# Patient Record
Sex: MALE | Race: WHITE | NOT HISPANIC OR LATINO | Employment: FULL TIME | URBAN - METROPOLITAN AREA
[De-identification: names, ages, dates, MRNs, and addresses within clinical notes are randomized per-mention and may not be internally consistent; named-entity substitution may affect disease eponyms.]

---

## 2023-06-05 DIAGNOSIS — I10 ESSENTIAL HYPERTENSION WITH GOAL BLOOD PRESSURE LESS THAN 130/85: Primary | ICD-10-CM

## 2023-06-05 RX ORDER — ATENOLOL 25 MG/1
TABLET ORAL
Qty: 30 TABLET | Refills: 0 | Status: SHIPPED | OUTPATIENT
Start: 2023-06-05 | End: 2023-06-15

## 2023-06-14 DIAGNOSIS — I10 ESSENTIAL HYPERTENSION WITH GOAL BLOOD PRESSURE LESS THAN 130/85: ICD-10-CM

## 2023-06-15 ENCOUNTER — TELEPHONE (OUTPATIENT)
Dept: PRIMARY CARE | Facility: CLINIC | Age: 68
End: 2023-06-15
Payer: COMMERCIAL

## 2023-06-15 RX ORDER — ATENOLOL 25 MG/1
TABLET ORAL
Qty: 30 TABLET | Refills: 0 | Status: SHIPPED | OUTPATIENT
Start: 2023-06-15 | End: 2023-06-29

## 2023-06-15 NOTE — TELEPHONE ENCOUNTER
Pharmacy continues to request refills on his blood pressure medicine.  However he has not been in the office in quite a long time.  I sent a 30-day supply.  He needs to get in to the office.  Please call him and set up an appointment to see Dr. Gama within the next 3 weeks.  Thank you!

## 2023-06-27 PROBLEM — L30.9 ECZEMA OF HAND: Status: ACTIVE | Noted: 2023-06-27

## 2023-06-27 PROBLEM — H01.003 BLEPHARITIS, BOTH EYES: Status: ACTIVE | Noted: 2023-06-27

## 2023-06-27 PROBLEM — F41.8 ANXIETY ASSOCIATED WITH DEPRESSION: Status: ACTIVE | Noted: 2023-06-27

## 2023-06-27 PROBLEM — Z91.199 NONCOMPLIANCE WITH THERAPEUTIC PLAN: Status: ACTIVE | Noted: 2023-06-27

## 2023-06-27 PROBLEM — H91.90 HEARING LOSS: Status: ACTIVE | Noted: 2023-06-27

## 2023-06-27 PROBLEM — R73.09 ELEVATED GLUCOSE: Status: ACTIVE | Noted: 2023-06-27

## 2023-06-27 PROBLEM — H52.03 HYPEROPIA OF BOTH EYES: Status: ACTIVE | Noted: 2023-06-27

## 2023-06-27 PROBLEM — M17.0 ARTHRITIS OF BOTH KNEES: Status: ACTIVE | Noted: 2023-06-27

## 2023-06-27 PROBLEM — H52.02 HYPEROPIA WITH PRESBYOPIA OF LEFT EYE: Status: ACTIVE | Noted: 2023-06-27

## 2023-06-27 PROBLEM — E78.5 HYPERLIPIDEMIA: Status: ACTIVE | Noted: 2023-06-27

## 2023-06-27 PROBLEM — E11.36: Status: ACTIVE | Noted: 2023-06-27

## 2023-06-27 PROBLEM — J06.9 UPPER RESPIRATORY INFECTION, ACUTE: Status: ACTIVE | Noted: 2023-06-27

## 2023-06-27 PROBLEM — R19.4 BOWEL HABIT CHANGES: Status: ACTIVE | Noted: 2023-06-27

## 2023-06-27 PROBLEM — N40.0 BPH (BENIGN PROSTATIC HYPERPLASIA): Status: ACTIVE | Noted: 2023-06-27

## 2023-06-27 PROBLEM — M75.111 INCOMPLETE TEAR OF RIGHT ROTATOR CUFF: Status: ACTIVE | Noted: 2023-06-27

## 2023-06-27 PROBLEM — R73.03 PREDIABETES: Status: ACTIVE | Noted: 2023-06-27

## 2023-06-27 PROBLEM — H53.9 VISION CHANGES: Status: ACTIVE | Noted: 2023-06-27

## 2023-06-27 PROBLEM — H61.23 BILATERAL IMPACTED CERUMEN: Status: ACTIVE | Noted: 2023-06-27

## 2023-06-27 PROBLEM — K21.00 REFLUX ESOPHAGITIS: Status: ACTIVE | Noted: 2023-06-27

## 2023-06-27 PROBLEM — I10 ESSENTIAL HYPERTENSION WITH GOAL BLOOD PRESSURE LESS THAN 130/85: Status: ACTIVE | Noted: 2023-06-27

## 2023-06-27 PROBLEM — H01.006 BLEPHARITIS, BOTH EYES: Status: ACTIVE | Noted: 2023-06-27

## 2023-06-27 PROBLEM — H52.4 HYPEROPIA WITH PRESBYOPIA OF LEFT EYE: Status: ACTIVE | Noted: 2023-06-27

## 2023-06-27 PROBLEM — G47.00 INSOMNIA: Status: ACTIVE | Noted: 2023-06-27

## 2023-06-27 PROBLEM — H25.813 COMBINED FORM OF SENILE CATARACT OF BOTH EYES: Status: ACTIVE | Noted: 2023-06-27

## 2023-06-29 DIAGNOSIS — I10 ESSENTIAL HYPERTENSION WITH GOAL BLOOD PRESSURE LESS THAN 130/85: ICD-10-CM

## 2023-06-29 RX ORDER — ATENOLOL 25 MG/1
TABLET ORAL
Qty: 15 TABLET | Refills: 0 | Status: SHIPPED | OUTPATIENT
Start: 2023-06-29 | End: 2023-07-13

## 2023-07-03 ENCOUNTER — LAB (OUTPATIENT)
Dept: LAB | Facility: LAB | Age: 68
End: 2023-07-03
Payer: COMMERCIAL

## 2023-07-03 ENCOUNTER — OFFICE VISIT (OUTPATIENT)
Dept: PRIMARY CARE | Facility: CLINIC | Age: 68
End: 2023-07-03
Payer: COMMERCIAL

## 2023-07-03 VITALS
WEIGHT: 165.4 LBS | SYSTOLIC BLOOD PRESSURE: 148 MMHG | HEART RATE: 100 BPM | OXYGEN SATURATION: 95 % | DIASTOLIC BLOOD PRESSURE: 92 MMHG | HEIGHT: 72 IN | TEMPERATURE: 97.5 F | BODY MASS INDEX: 22.4 KG/M2

## 2023-07-03 DIAGNOSIS — Z13.21 ENCOUNTER FOR VITAMIN DEFICIENCY SCREENING: ICD-10-CM

## 2023-07-03 DIAGNOSIS — Z00.00 ROUTINE HEALTH MAINTENANCE: Primary | ICD-10-CM

## 2023-07-03 DIAGNOSIS — E78.5 HYPERLIPIDEMIA, UNSPECIFIED HYPERLIPIDEMIA TYPE: ICD-10-CM

## 2023-07-03 DIAGNOSIS — Z13.89 SCREENING FOR MULTIPLE CONDITIONS: ICD-10-CM

## 2023-07-03 DIAGNOSIS — K40.90 RIGHT INGUINAL HERNIA: ICD-10-CM

## 2023-07-03 DIAGNOSIS — I10 ESSENTIAL HYPERTENSION WITH GOAL BLOOD PRESSURE LESS THAN 130/85: ICD-10-CM

## 2023-07-03 DIAGNOSIS — Z00.00 ROUTINE HEALTH MAINTENANCE: ICD-10-CM

## 2023-07-03 DIAGNOSIS — K21.00 GASTROESOPHAGEAL REFLUX DISEASE WITH ESOPHAGITIS, UNSPECIFIED WHETHER HEMORRHAGE: ICD-10-CM

## 2023-07-03 DIAGNOSIS — H61.23 BILATERAL IMPACTED CERUMEN: ICD-10-CM

## 2023-07-03 DIAGNOSIS — R73.09 ELEVATED GLUCOSE: ICD-10-CM

## 2023-07-03 DIAGNOSIS — N40.0 BENIGN PROSTATIC HYPERPLASIA, UNSPECIFIED WHETHER LOWER URINARY TRACT SYMPTOMS PRESENT: ICD-10-CM

## 2023-07-03 DIAGNOSIS — L30.9 ECZEMA OF HAND: ICD-10-CM

## 2023-07-03 PROBLEM — K80.50 BILIARY COLIC: Status: ACTIVE | Noted: 2023-03-22

## 2023-07-03 LAB
CALCIDIOL (25 OH VITAMIN D3) (NG/ML) IN SER/PLAS: 38 NG/ML
CHOLESTEROL (MG/DL) IN SER/PLAS: 157 MG/DL (ref 0–199)
CHOLESTEROL IN HDL (MG/DL) IN SER/PLAS: 63.2 MG/DL
CHOLESTEROL/HDL RATIO: 2.5
ESTIMATED AVERAGE GLUCOSE FOR HBA1C: 120 MG/DL
HEMOGLOBIN A1C/HEMOGLOBIN TOTAL IN BLOOD: 5.8 %
LDL: 84 MG/DL (ref 0–99)
THYROTROPIN (MIU/L) IN SER/PLAS BY DETECTION LIMIT <= 0.05 MIU/L: 0.9 MIU/L (ref 0.44–3.98)
TRIGLYCERIDE (MG/DL) IN SER/PLAS: 47 MG/DL (ref 0–149)
VLDL: 9 MG/DL (ref 0–40)

## 2023-07-03 PROCEDURE — 1126F AMNT PAIN NOTED NONE PRSNT: CPT | Performed by: STUDENT IN AN ORGANIZED HEALTH CARE EDUCATION/TRAINING PROGRAM

## 2023-07-03 PROCEDURE — 99397 PER PM REEVAL EST PAT 65+ YR: CPT | Performed by: STUDENT IN AN ORGANIZED HEALTH CARE EDUCATION/TRAINING PROGRAM

## 2023-07-03 PROCEDURE — 82306 VITAMIN D 25 HYDROXY: CPT

## 2023-07-03 PROCEDURE — 80061 LIPID PANEL: CPT

## 2023-07-03 PROCEDURE — 3080F DIAST BP >= 90 MM HG: CPT | Performed by: STUDENT IN AN ORGANIZED HEALTH CARE EDUCATION/TRAINING PROGRAM

## 2023-07-03 PROCEDURE — 3077F SYST BP >= 140 MM HG: CPT | Performed by: STUDENT IN AN ORGANIZED HEALTH CARE EDUCATION/TRAINING PROGRAM

## 2023-07-03 PROCEDURE — G0444 DEPRESSION SCREEN ANNUAL: HCPCS | Performed by: STUDENT IN AN ORGANIZED HEALTH CARE EDUCATION/TRAINING PROGRAM

## 2023-07-03 PROCEDURE — G0442 ANNUAL ALCOHOL SCREEN 15 MIN: HCPCS | Performed by: STUDENT IN AN ORGANIZED HEALTH CARE EDUCATION/TRAINING PROGRAM

## 2023-07-03 PROCEDURE — 1160F RVW MEDS BY RX/DR IN RCRD: CPT | Performed by: STUDENT IN AN ORGANIZED HEALTH CARE EDUCATION/TRAINING PROGRAM

## 2023-07-03 PROCEDURE — 84443 ASSAY THYROID STIM HORMONE: CPT

## 2023-07-03 PROCEDURE — 83036 HEMOGLOBIN GLYCOSYLATED A1C: CPT

## 2023-07-03 PROCEDURE — 1159F MED LIST DOCD IN RCRD: CPT | Performed by: STUDENT IN AN ORGANIZED HEALTH CARE EDUCATION/TRAINING PROGRAM

## 2023-07-03 PROCEDURE — 1036F TOBACCO NON-USER: CPT | Performed by: STUDENT IN AN ORGANIZED HEALTH CARE EDUCATION/TRAINING PROGRAM

## 2023-07-03 PROCEDURE — 36415 COLL VENOUS BLD VENIPUNCTURE: CPT

## 2023-07-03 RX ORDER — ATORVASTATIN CALCIUM 10 MG/1
10 TABLET, FILM COATED ORAL
COMMUNITY
Start: 2021-11-28 | End: 2023-07-09 | Stop reason: SDUPTHER

## 2023-07-03 RX ORDER — ASPIRIN 81 MG/1
81 TABLET ORAL ONCE
COMMUNITY
Start: 2020-12-18

## 2023-07-03 ASSESSMENT — ENCOUNTER SYMPTOMS
NAUSEA: 0
VOMITING: 0
DIZZINESS: 0
LIGHT-HEADEDNESS: 0
DYSURIA: 0
CHILLS: 0
PALPITATIONS: 0
FEVER: 0
DIARRHEA: 0
SHORTNESS OF BREATH: 0
DIAPHORESIS: 0

## 2023-07-03 ASSESSMENT — PATIENT HEALTH QUESTIONNAIRE - PHQ9
2. FEELING DOWN, DEPRESSED OR HOPELESS: NOT AT ALL
SUM OF ALL RESPONSES TO PHQ9 QUESTIONS 1 AND 2: 0
1. LITTLE INTEREST OR PLEASURE IN DOING THINGS: NOT AT ALL

## 2023-07-03 NOTE — PROGRESS NOTES
Subjective   Patient ID: Taco Alvarez is a 68 y.o. male who presents for Annual Exam.  Here for CPE. No acute issues today.    He stopped drinking alcohol for 5 months and still drinks on the weekend occasionally. BP at home was 122 reportedly. BP cuff at home is from [employer]. Currently drinking 6-8 beers/week. Was drinking a lot of whisky previously, was drinking 1-2 bottles of whisky/week. 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 Wife uses same BP cuff at home and her BP are consistent in office as they are at home. He is starting to eat more salads and more chicken, fish, and lost weight. He is not taking any blood pressure medication since winter since he had COVID in 2022 winter.    Still getting AM erections.          Review of Systems   Constitutional:  Negative for chills, diaphoresis and fever.   HENT:  Negative for hearing loss.    Eyes:  Negative for visual disturbance.   Respiratory:  Negative for shortness of breath.    Cardiovascular:  Negative for chest pain and palpitations.   Gastrointestinal:  Negative for diarrhea, nausea and vomiting.   Endocrine: Negative for cold intolerance and heat intolerance.   Genitourinary:  Negative for dysuria.   Skin:  Positive for rash.   Neurological:  Negative for dizziness and light-headedness.       BP (!) 148/92   Pulse 100   Temp 36.4 °C (97.5 °F)   Ht 1.829 m (6')   Wt 75 kg (165 lb 6.4 oz)   SpO2 95%   BMI 22.43 kg/m²     Objective   Physical Exam  Vitals reviewed.   Constitutional:       General: He is not in acute distress.     Appearance: Normal appearance.   HENT:      Head: Normocephalic.      Right Ear: Tympanic membrane, ear canal and external ear normal.      Left Ear: Tympanic membrane, ear canal and external ear normal.      Ears:      Comments: Impacted cerumen     Mouth/Throat:      Mouth: Mucous membranes are moist.      Pharynx: Oropharynx is clear. No oropharyngeal exudate or posterior oropharyngeal erythema.    Cardiovascular:      Rate and Rhythm: Normal rate and regular rhythm.   Pulmonary:      Effort: Pulmonary effort is normal. No respiratory distress.      Breath sounds: Normal breath sounds.   Abdominal:      General: Bowel sounds are normal. There is no distension.      Palpations: Abdomen is soft. There is no mass.      Tenderness: There is no abdominal tenderness. There is no guarding or rebound.   Musculoskeletal:         General: No deformity.      Cervical back: Neck supple. No tenderness.      Comments: Hand flaking present bilaterally   Lymphadenopathy:      Cervical: No cervical adenopathy.   Skin:     Coloration: Skin is not jaundiced.   Neurological:      Mental Status: He is alert.         Assessment/Plan   Problem List Items Addressed This Visit       Bilateral impacted cerumen    BPH (benign prostatic hyperplasia)    Eczema of hand    Relevant Orders    Referral to Dermatology    Elevated glucose    Relevant Orders    Hemoglobin A1c (Completed)    Essential hypertension with goal blood pressure less than 130/85    Relevant Orders    Follow Up In [facility] - Pharmacy    Hyperlipidemia    Relevant Medications    atorvastatin (Lipitor) 10 mg tablet    Reflux esophagitis    Right inguinal hernia     Other Visit Diagnoses       Routine health maintenance    -  Primary    Relevant Orders    Lipid Panel (Completed)    TSH with reflex to Free T4 if abnormal (Completed)    Colonoscopy    Follow Up In [facility] - PCP - Health Maintenance    Encounter for vitamin deficiency screening        Relevant Orders    Vitamin D, Total (Completed)    Screening for multiple conditions [Z13.89]            CPE completed.  Advised to keep a heart healthy, low fat  diet with fruits and veggies like Mediterranean diet.  Advised on the importance of exercise and maintaining 150 minutes of exercise per week (30 minutes per day 5 days a week).  Advised on regular eye and dental visits.  Discussed age  appropriate cancer screening, immunizations and recommendations given.  Discussed avoiding illicit drugs and tobacco. Advised on appropriate use of alcohol.  Advised to wear seat belt.    Hypertension / white coat effect - intolerant to losartan / hctz - shortness of breath. Presently doing OK w/ atenolol. Blood pressure borderline on recheck. Discussed systolic goal blood pressure under 130. He declined additional medication in the past.   Blood pressure not at goal in office but reportedly normal at home. Referred to [facility] pharmacy to validate BP cuff and assist with BP control.      Walking goal-encouraged 30 minutes 5 days weekly -previously suggested last time.     Dyslipidemia / elevated 10 year cardiac risk assessment around 12% February 2019, 81.1% 7/2023-statin. He had discontinued aspirin with recent press reports.     s/p right hernia repair Jan '19 per Dr. Smith / history of left inguinal hernia repair 1980s-he is doing well presently He will f/u with Dr. Smith with concerns.     acid reflux - occas. OTC pepcid used in past     winter hand exzema - cream used in winter. saw Dr. Quintana in early 2017. Presently topical creams used as needed. Encourage this especially in the wintertime. Referred to Derm     BPH-annual PSA continues each February for prostate cancer screening. Nocturia not a present concern.    Awaiting annual PSA     Colonoscopy care- will continue colonoscopy surveillance schedule as below. Updated November 2012 with Dr. Scott at the [facility]-next in 8 years-November 2020.    Colonoscopy overdue-ordered again.     Knee arthritis-caution with overactivity. Right knee remains problematic occasionally, stable     History of partial right rotator cuff tear-he is tolerating occasional shoulder pain.    Doing well for the most part though - rarely sore; limited strength though.     Hearing concerns-his wife, Peggy notes that his hearing is a bit diminished. At this point he doesn't  want to consider audiology consultation     History Bilateral cerumen - noted on recent DOT exam. Last time suggested Debrox drops twice a day for 7-10 days and then follow-up for ear lavage. Asymptomatic. Once again explained home routine he would rather do this then see ENT last time.   ENT evaluation to remove cerumen recommended and he deferred at this time.     dental care - he follows w/ regular dentist twice yearly, as well as a gum specialist twice yearly, due-will see this year     Vision concerns-reading glasses /history of early cataract- last visit w/ Dr. Reid 2015. Encouraged annual eye exam again.     sleeplessness -mainly a problem working night shift. He was on trazodone but now on day shift, he has discontinued this. 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 For the most part doing well presently. Maybe going to 4 days / week in 2022, but still works 50 hours most weeks     Caregiving concerns-his wife had been diagnosed with a sinus fungal mass which has been removed in ENT. Support provided she has a lot of health concerns with her rheumatology medications and immunosuppressed state. Lots of medical bills so he continues to work overtime to help.     DOT exam - annually w/ HTN. He goes to [place].     Flu shot encouraged each fall-- he declined     Discussed Shingrix / new shingles shot - 2 shot series w/ limited availability. Encouraged patient to consider getting this when/ where available.      Pneumovax given last year, recommend PCV 20.     Covid series-completed; Rec bivalent booster     Follow-up semianually, sooner with concerns    Labs ordered

## 2023-07-07 ENCOUNTER — TELEPHONE (OUTPATIENT)
Dept: PRIMARY CARE | Facility: CLINIC | Age: 68
End: 2023-07-07
Payer: COMMERCIAL

## 2023-07-07 NOTE — TELEPHONE ENCOUNTER
----- Message from Cirilo Gama DO sent at 7/6/2023  3:46 PM EDT -----  Please let him know his labs are all largely normal. A1c is un changed at 5.7 which is pre diabetes. I also looked in the old system to look for pneumonia vaccinations and recommend PCV20 pneumonia vaccine at pharmacy as he had a pneumonia vaccine last year.

## 2023-07-09 RX ORDER — ATORVASTATIN CALCIUM 10 MG/1
10 TABLET, FILM COATED ORAL DAILY
Qty: 90 TABLET | Refills: 0 | Status: SHIPPED | OUTPATIENT
Start: 2023-07-09 | End: 2023-10-07

## 2023-07-11 ENCOUNTER — TELEPHONE (OUTPATIENT)
Dept: PRIMARY CARE | Facility: CLINIC | Age: 68
End: 2023-07-11
Payer: COMMERCIAL

## 2023-07-11 NOTE — TELEPHONE ENCOUNTER
----- Message from Cirilo Gama DO sent at 7/9/2023 10:29 AM EDT -----  Thank you for leaving a message for him- I also refilled atorvastatin as he should be taking it to reduce risk of heart attack and stroke.

## 2023-07-12 DIAGNOSIS — I10 ESSENTIAL HYPERTENSION WITH GOAL BLOOD PRESSURE LESS THAN 130/85: ICD-10-CM

## 2023-07-13 DIAGNOSIS — I10 ESSENTIAL HYPERTENSION WITH GOAL BLOOD PRESSURE LESS THAN 130/85: Primary | ICD-10-CM

## 2023-07-13 DIAGNOSIS — R35.1 BENIGN PROSTATIC HYPERPLASIA WITH NOCTURIA: Primary | ICD-10-CM

## 2023-07-13 DIAGNOSIS — I10 ESSENTIAL HYPERTENSION WITH GOAL BLOOD PRESSURE LESS THAN 130/85: ICD-10-CM

## 2023-07-13 DIAGNOSIS — N40.1 BENIGN PROSTATIC HYPERPLASIA WITH NOCTURIA: Primary | ICD-10-CM

## 2023-07-13 PROBLEM — E78.5 HYPERLIPIDEMIA: Status: RESOLVED | Noted: 2023-06-27 | Resolved: 2023-07-13

## 2023-07-13 PROBLEM — R73.09 ELEVATED GLUCOSE: Status: RESOLVED | Noted: 2023-06-27 | Resolved: 2023-07-13

## 2023-07-13 RX ORDER — ATENOLOL 50 MG/1
TABLET ORAL
Qty: 30 TABLET | Refills: 5 | Status: SHIPPED | OUTPATIENT
Start: 2023-07-13 | End: 2023-08-16

## 2023-08-16 DIAGNOSIS — I10 ESSENTIAL HYPERTENSION WITH GOAL BLOOD PRESSURE LESS THAN 130/85: ICD-10-CM

## 2023-08-16 RX ORDER — ATENOLOL 50 MG/1
TABLET ORAL
Qty: 30 TABLET | Refills: 3 | Status: SHIPPED | OUTPATIENT
Start: 2023-08-16 | End: 2024-01-15 | Stop reason: ALTCHOICE

## 2023-10-07 DIAGNOSIS — E78.5 HYPERLIPIDEMIA, UNSPECIFIED HYPERLIPIDEMIA TYPE: ICD-10-CM

## 2023-10-07 RX ORDER — ATORVASTATIN CALCIUM 10 MG/1
10 TABLET, FILM COATED ORAL DAILY
Qty: 90 TABLET | Refills: 0 | Status: SHIPPED | OUTPATIENT
Start: 2023-10-07 | End: 2024-03-03 | Stop reason: SDUPTHER

## 2024-01-08 ENCOUNTER — TELEPHONE (OUTPATIENT)
Dept: PRIMARY CARE | Facility: CLINIC | Age: 69
End: 2024-01-08
Payer: COMMERCIAL

## 2024-01-08 NOTE — TELEPHONE ENCOUNTER
Pts spouse called in stating Taco called her asking to set up an appt for Dr Eldridge. Stating he has some tingling/numbness in his legs, lethargic, and frequent urination. I advised pt to go to an ER since she stated he was pre-diabetic just to make sure everything was okay and and to run testing. I did set up an appt with PF since [facility] is booked up for 1/16/24. Please advise. Thank you!

## 2024-01-10 NOTE — TELEPHONE ENCOUNTER
Constellation of symptoms patient refused to go to the ER  Will  await office evaluation early next week as scheduled

## 2024-01-15 ENCOUNTER — OFFICE VISIT (OUTPATIENT)
Dept: PRIMARY CARE | Facility: CLINIC | Age: 69
End: 2024-01-15
Payer: COMMERCIAL

## 2024-01-15 ENCOUNTER — LAB (OUTPATIENT)
Dept: LAB | Facility: LAB | Age: 69
End: 2024-01-15
Payer: COMMERCIAL

## 2024-01-15 ENCOUNTER — APPOINTMENT (OUTPATIENT)
Dept: PRIMARY CARE | Facility: CLINIC | Age: 69
End: 2024-01-15
Payer: COMMERCIAL

## 2024-01-15 VITALS
TEMPERATURE: 97.4 F | SYSTOLIC BLOOD PRESSURE: 162 MMHG | RESPIRATION RATE: 16 BRPM | BODY MASS INDEX: 23.63 KG/M2 | DIASTOLIC BLOOD PRESSURE: 104 MMHG | OXYGEN SATURATION: 95 % | HEIGHT: 71 IN | WEIGHT: 168.8 LBS | HEART RATE: 67 BPM

## 2024-01-15 DIAGNOSIS — R35.1 BENIGN PROSTATIC HYPERPLASIA WITH NOCTURIA: ICD-10-CM

## 2024-01-15 DIAGNOSIS — H26.9 CATARACT OF BOTH EYES, UNSPECIFIED CATARACT TYPE: Chronic | ICD-10-CM

## 2024-01-15 DIAGNOSIS — R73.03 PREDIABETES: ICD-10-CM

## 2024-01-15 DIAGNOSIS — E55.9 VITAMIN D DEFICIENCY: Chronic | ICD-10-CM

## 2024-01-15 DIAGNOSIS — Z91.199 NONCOMPLIANCE WITH THERAPEUTIC PLAN: Chronic | ICD-10-CM

## 2024-01-15 DIAGNOSIS — R73.03 PREDIABETES: Chronic | ICD-10-CM

## 2024-01-15 DIAGNOSIS — N40.1 BPH ASSOCIATED WITH NOCTURIA: ICD-10-CM

## 2024-01-15 DIAGNOSIS — N40.1 BENIGN PROSTATIC HYPERPLASIA WITH NOCTURIA: ICD-10-CM

## 2024-01-15 DIAGNOSIS — N40.1 BPH ASSOCIATED WITH NOCTURIA: Chronic | ICD-10-CM

## 2024-01-15 DIAGNOSIS — R35.1 BPH ASSOCIATED WITH NOCTURIA: Chronic | ICD-10-CM

## 2024-01-15 DIAGNOSIS — I10 ESSENTIAL HYPERTENSION WITH GOAL BLOOD PRESSURE LESS THAN 130/85: Primary | Chronic | ICD-10-CM

## 2024-01-15 DIAGNOSIS — E55.9 VITAMIN D DEFICIENCY: ICD-10-CM

## 2024-01-15 DIAGNOSIS — K80.21 CALCULUS OF GALLBLADDER WITH BILIARY OBSTRUCTION BUT WITHOUT CHOLECYSTITIS: Chronic | ICD-10-CM

## 2024-01-15 DIAGNOSIS — R20.0 NUMBNESS AND TINGLING OF BOTH LEGS BELOW KNEES: Chronic | ICD-10-CM

## 2024-01-15 DIAGNOSIS — R20.2 NUMBNESS AND TINGLING OF BOTH LEGS BELOW KNEES: ICD-10-CM

## 2024-01-15 DIAGNOSIS — E78.5 DYSLIPIDEMIA, GOAL LDL BELOW 100: ICD-10-CM

## 2024-01-15 DIAGNOSIS — R20.0 NUMBNESS AND TINGLING OF BOTH LEGS BELOW KNEES: ICD-10-CM

## 2024-01-15 DIAGNOSIS — G47.30 SLEEP APNEA, UNSPECIFIED TYPE: Chronic | ICD-10-CM

## 2024-01-15 DIAGNOSIS — R20.2 NUMBNESS AND TINGLING OF BOTH LEGS BELOW KNEES: Chronic | ICD-10-CM

## 2024-01-15 DIAGNOSIS — R35.1 BPH ASSOCIATED WITH NOCTURIA: ICD-10-CM

## 2024-01-15 DIAGNOSIS — E78.5 DYSLIPIDEMIA, GOAL LDL BELOW 100: Chronic | ICD-10-CM

## 2024-01-15 PROBLEM — E11.36: Status: RESOLVED | Noted: 2023-06-27 | Resolved: 2024-01-15

## 2024-01-15 LAB
25(OH)D3 SERPL-MCNC: 30 NG/ML (ref 30–100)
ALBUMIN SERPL BCP-MCNC: 4.4 G/DL (ref 3.4–5)
ALP SERPL-CCNC: 57 U/L (ref 33–136)
ALT SERPL W P-5'-P-CCNC: 13 U/L (ref 10–52)
ANION GAP SERPL CALC-SCNC: 12 MMOL/L (ref 10–20)
AST SERPL W P-5'-P-CCNC: 20 U/L (ref 9–39)
BILIRUB SERPL-MCNC: 0.5 MG/DL (ref 0–1.2)
BUN SERPL-MCNC: 20 MG/DL (ref 6–23)
CALCIUM SERPL-MCNC: 9.4 MG/DL (ref 8.6–10.3)
CHLORIDE SERPL-SCNC: 103 MMOL/L (ref 98–107)
CHOLEST SERPL-MCNC: 169 MG/DL (ref 0–199)
CHOLESTEROL/HDL RATIO: 2.2
CO2 SERPL-SCNC: 29 MMOL/L (ref 21–32)
CREAT SERPL-MCNC: 0.96 MG/DL (ref 0.5–1.3)
EGFRCR SERPLBLD CKD-EPI 2021: 86 ML/MIN/1.73M*2
ERYTHROCYTE [DISTWIDTH] IN BLOOD BY AUTOMATED COUNT: 12.3 % (ref 11.5–14.5)
EST. AVERAGE GLUCOSE BLD GHB EST-MCNC: 120 MG/DL
FOLATE SERPL-MCNC: 16.6 NG/ML
GLUCOSE SERPL-MCNC: 97 MG/DL (ref 74–99)
HBA1C MFR BLD: 5.8 %
HCT VFR BLD AUTO: 46 % (ref 41–52)
HDLC SERPL-MCNC: 77.4 MG/DL
HGB BLD-MCNC: 15 G/DL (ref 13.5–17.5)
IRON SATN MFR SERPL: 35 % (ref 25–45)
IRON SERPL-MCNC: 105 UG/DL (ref 35–150)
LDLC SERPL CALC-MCNC: 81 MG/DL
MCH RBC QN AUTO: 28.7 PG (ref 26–34)
MCHC RBC AUTO-ENTMCNC: 32.6 G/DL (ref 32–36)
MCV RBC AUTO: 88 FL (ref 80–100)
NON HDL CHOLESTEROL: 92 MG/DL (ref 0–149)
NRBC BLD-RTO: 0 /100 WBCS (ref 0–0)
PLATELET # BLD AUTO: 195 X10*3/UL (ref 150–450)
POTASSIUM SERPL-SCNC: 4.9 MMOL/L (ref 3.5–5.3)
PROT SERPL-MCNC: 7.3 G/DL (ref 6.4–8.2)
PSA SERPL-MCNC: 0.72 NG/ML
RBC # BLD AUTO: 5.23 X10*6/UL (ref 4.5–5.9)
SODIUM SERPL-SCNC: 139 MMOL/L (ref 136–145)
TIBC SERPL-MCNC: 297 UG/DL (ref 240–445)
TRIGL SERPL-MCNC: 55 MG/DL (ref 0–149)
TSH SERPL-ACNC: 0.76 MIU/L (ref 0.44–3.98)
UIBC SERPL-MCNC: 192 UG/DL (ref 110–370)
VIT B12 SERPL-MCNC: 437 PG/ML (ref 211–911)
VLDL: 11 MG/DL (ref 0–40)
WBC # BLD AUTO: 7.1 X10*3/UL (ref 4.4–11.3)

## 2024-01-15 PROCEDURE — 83036 HEMOGLOBIN GLYCOSYLATED A1C: CPT

## 2024-01-15 PROCEDURE — 85027 COMPLETE CBC AUTOMATED: CPT

## 2024-01-15 PROCEDURE — 36415 COLL VENOUS BLD VENIPUNCTURE: CPT

## 2024-01-15 PROCEDURE — 82746 ASSAY OF FOLIC ACID SERUM: CPT

## 2024-01-15 PROCEDURE — 83550 IRON BINDING TEST: CPT

## 2024-01-15 PROCEDURE — 1159F MED LIST DOCD IN RCRD: CPT | Performed by: INTERNAL MEDICINE

## 2024-01-15 PROCEDURE — 80053 COMPREHEN METABOLIC PANEL: CPT

## 2024-01-15 PROCEDURE — 83540 ASSAY OF IRON: CPT

## 2024-01-15 PROCEDURE — 3080F DIAST BP >= 90 MM HG: CPT | Performed by: INTERNAL MEDICINE

## 2024-01-15 PROCEDURE — 93000 ELECTROCARDIOGRAM COMPLETE: CPT | Performed by: INTERNAL MEDICINE

## 2024-01-15 PROCEDURE — 84153 ASSAY OF PSA TOTAL: CPT

## 2024-01-15 PROCEDURE — 82607 VITAMIN B-12: CPT

## 2024-01-15 PROCEDURE — 99397 PER PM REEVAL EST PAT 65+ YR: CPT | Performed by: INTERNAL MEDICINE

## 2024-01-15 PROCEDURE — 80061 LIPID PANEL: CPT

## 2024-01-15 PROCEDURE — 82306 VITAMIN D 25 HYDROXY: CPT

## 2024-01-15 PROCEDURE — 3077F SYST BP >= 140 MM HG: CPT | Performed by: INTERNAL MEDICINE

## 2024-01-15 PROCEDURE — 1036F TOBACCO NON-USER: CPT | Performed by: INTERNAL MEDICINE

## 2024-01-15 PROCEDURE — 1126F AMNT PAIN NOTED NONE PRSNT: CPT | Performed by: INTERNAL MEDICINE

## 2024-01-15 PROCEDURE — 84443 ASSAY THYROID STIM HORMONE: CPT

## 2024-01-15 RX ORDER — CARVEDILOL 12.5 MG/1
TABLET ORAL
Qty: 60 TABLET | Refills: 11 | Status: SHIPPED | OUTPATIENT
Start: 2024-01-15

## 2024-01-15 ASSESSMENT — PATIENT HEALTH QUESTIONNAIRE - PHQ9
1. LITTLE INTEREST OR PLEASURE IN DOING THINGS: NOT AT ALL
2. FEELING DOWN, DEPRESSED OR HOPELESS: NOT AT ALL
SUM OF ALL RESPONSES TO PHQ9 QUESTIONS 1 AND 2: 0

## 2024-01-15 ASSESSMENT — ENCOUNTER SYMPTOMS
OCCASIONAL FEELINGS OF UNSTEADINESS: 0
LOSS OF SENSATION IN FEET: 0
DEPRESSION: 0

## 2024-01-15 NOTE — PROGRESS NOTES
"Subjective   Patient ID: Taco Alvarez is a 68 y.o. male who presents for Annual Exam.    Here for wellness visit  Last here nearly 2 years ago, in March 2022  He is here with his wife Peggy  He has a number of concerns-he notes his arms and legs feel tingly and weak at times intermittently  Fatigue and dry mouth also an issue.  He notes urination a lot.  He feels hungry all the time.  He has done his research he states that he worries about diabetes.  He has a history of prediabetes    Last winter he had cholecystitis twice-and was scheduled to have a cholecystectomy but canceled this.  He states he did his research and did not want to have further surgery.  Remarkably his sister has had gallstone pancreatitis         Review of Systems    Objective   BP (!) 162/104 (BP Location: Left arm, Patient Position: Sitting, BP Cuff Size: Large adult)   Pulse 67   Temp 36.3 °C (97.4 °F)   Resp 16   Ht 1.803 m (5' 11\")   Wt 76.6 kg (168 lb 12.8 oz)   SpO2 95%   BMI 23.54 kg/m²     Physical Exam  Constitutional:       Appearance: Normal appearance.   HENT:      Head: Normocephalic and atraumatic.      Right Ear: Tympanic membrane normal.      Left Ear: Tympanic membrane normal.      Nose: Nose normal.   Eyes:      General: No scleral icterus.     Extraocular Movements: Extraocular movements intact.      Conjunctiva/sclera: Conjunctivae normal.      Pupils: Pupils are equal, round, and reactive to light.   Cardiovascular:      Rate and Rhythm: Normal rate and regular rhythm.      Pulses: Normal pulses.      Heart sounds: Normal heart sounds. No murmur heard.  Pulmonary:      Effort: Pulmonary effort is normal. No respiratory distress.      Breath sounds: Normal breath sounds. No stridor. No wheezing.   Abdominal:      General: Abdomen is flat. Bowel sounds are normal. There is no distension.      Palpations: Abdomen is soft. There is no mass.      Tenderness: There is no abdominal tenderness. There is no guarding. "   Musculoskeletal:         General: No swelling, tenderness or deformity. Normal range of motion.      Cervical back: Normal range of motion and neck supple. No tenderness.   Lymphadenopathy:      Cervical: No cervical adenopathy.   Skin:     General: Skin is warm and dry.      Findings: No lesion or rash.   Neurological:      General: No focal deficit present.      Mental Status: He is alert and oriented to person, place, and time.      Cranial Nerves: No cranial nerve deficit.      Motor: No weakness.   Psychiatric:         Mood and Affect: Mood normal.         Behavior: Behavior normal.         Thought Content: Thought content normal.         Judgment: Judgment normal.         Assessment/Plan   Problem List Items Addressed This Visit             ICD-10-CM    Cataract of both eyes H26.9    Essential hypertension with goal blood pressure less than 130/85 - Primary I10    Relevant Medications    carvedilol (Coreg) 12.5 mg tablet    Other Relevant Orders    CT cardiac scoring wo IV contrast    Dyslipidemia, goal LDL below 100 E78.5    Relevant Orders    ECG 12 lead (Clinic Performed)    Lipid Panel    CT cardiac scoring wo IV contrast    Noncompliance with therapeutic plan Z91.199    Prediabetes R73.03    Relevant Orders    Hemoglobin A1C    Numbness and tingling of both legs below knees R20.0, R20.2    Relevant Orders    EMG & nerve conduction    CBC (Completed)    Comprehensive Metabolic Panel    TSH with reflex to Free T4 if abnormal    Vitamin B12    Folate    Iron and TIBC    Vitamin D deficiency E55.9    Relevant Orders    Vitamin D 25-Hydroxy,Total (for eval of Vitamin D levels)    BPH associated with nocturia N40.1, R35.1    Relevant Orders    Prostate Specific Antigen    Sleep apnea G47.30    Relevant Orders    Referral to Adult Sleep Medicine     Other Visit Diagnoses         Codes    Calculus of gallbladder with biliary obstruction but without cholecystitis  (Chronic)    K80.21    Relevant Orders    US  abdomen complete    Referral to General Surgery             Noncompliance-he has not followed up here semiannually. He has refused additional blood pressure medication.              1/24-remarkably he has not been here in nearly 2 years-strongly encouraged visits at least semiannually to optimize his care plan    Health care coordination-his wife Peggy was with him today     Hypertension / white coat effect - intolerant to losartan / hctz - shortness of breath. Presently doing OK w/ atenolol. Blood pressure borderline on recheck. Discussed systolic goal blood pressure under 130. He declined additional medication in the past.   Blood pressure not at goal. He refuses additional medication at this time. Calcium score ordered last time         1/24-blood pressure is very high-I told him this could lead to a stroke or heart attack.  I recommended he discontinue daily atenolol and instead use twice daily carvedilol.  I asked him to return in 6 weeks to review his blood pressure.  CT his calcium score ordered         Walking goal-encouraged 30 minutes 5 days weekly suggested last time. He is not doing that presently     Dyslipidemia / elevated 10 year cardiac risk assessment around 12% February 2019-statin and baby aspirin recommended.                     Awaiting follow-up lipids. He has discontinued aspirin with recent press reports.  CT calcium score ordered again 1/24    Recurrent cholecystitis-January and March 2023.  Remarkably he canceled his planned surgery.  Remarkably his sister has had gallstone pancreatitis.  I recommended an ultrasound and general surgical consultation    Intermittent arm and leg tingling-history and exam nondiagnostic-he will check labs and set up an EMG study    Excessive fatigue-his wife says he has snoring and stops breathing at times at night.  In light of his witnessed apnea-sleep evaluation    Prediabetes-we will assess to see where his values are       s/p right hernia repair Jan '19  per Dr. Smith / history of left inguinal hernia repair 1980s-he is doing well presently He will f/u with Dr. Smith with concerns.     acid reflux - occas. OTC pepcid used     winter hand exzema - cream used in winter. saw Dr. Quintana in early 2017. Presently topical creams used as needed. Encourage this especially in the wintertime.     BPH-annual PSA continues each February for prostate cancer screening.                    As he does not come back regularly, PSA overdue.  Ordered    Colonoscopy care- will continue colonoscopy surveillance schedule as below. Updated November 2012 with Dr. Scott at the [facility]-next in 8 years-November 2020. Asymptomatic from a GI standpoint   Colonoscopy overdue-ordered again last time     Knee arthritis-caution with overactivity. Right knee remains problematic occasionally     History of partial right rotator cuff tear-he is tolerating occasional shoulder pain.    Doing well for the most part though - rarely sore; limited strength tho.     Hearing concerns-his wife, Peggy notes that his hearing is a bit diminished. At this point he doesn't want to consider audiology consultation     History Bilateral cerumen - noted on recent DOT exam. Last time suggested Debrox drops twice a day for 7-10 days and then follow-up for ear lavage. Asymptomatic. Once again explained home routine he would rather do this then see ENT last time.   ENT evaluation to remove cerumen recommended     dental care - he follows w/ regular dentist twice yearly, as well as a gum specialist twice yearly     Vision concerns-reading glasses /history of early cataract- last visit w/ Dr. Reid 2015. Encouraged annual eye exam-ordered            [place] cataracts bilateral noted at his last appointment with Dr. Yepez in optometry in Feb '20.  Encouraged him to set up an appointment to see her.  Remarkably initially he said he did not have cataracts but I showed him her office note documenting  this-strongly encouraged him to get in to get seen     sleeplessness -mainly a problem working night shift. He was on trazodone but now on day shift, he has discontinued this.     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 For the most part doing well presently. Maybe going to 4 days / week in 2022, but still works 50 hours most weeks     Caregiving concerns-his wife has been diagnosed with a sinus fungal mass which has been removed in ENT. Support provided she has a lot of health concerns with her rheumatology medications and immunosuppressed state. Lots of medical bills so he continues to work overtime to help.     DOT exam - annually w/ HTN. He goes to [place]. Just updated Feb 2022.     Flu shot encouraged each fall-- he declines     Discussed Shingrix / new shingles shot - 2 shot series w/ limited availability. Encouraged patient to consider getting this when/ where available.      pneumovax updated today     Covid series-completed; booster too     Follow-up semianually, sooner with concerns-after visit in 6 weeks to reassess his blood pressure labs and abdominal ultrasound results     Charting was completed using voice recognition technology and may include unintended errors.

## 2024-01-16 ENCOUNTER — APPOINTMENT (OUTPATIENT)
Dept: PRIMARY CARE | Facility: CLINIC | Age: 69
End: 2024-01-16
Payer: COMMERCIAL

## 2024-01-16 NOTE — RESULT ENCOUNTER NOTE
Iker    Thank you for coming in for your visit, and for doing the fasting labs.  I am glad that the liver, thyroid, kidney and prostate labs look fine.    The elevated A1c confirms prediabetes.  Please continue to optimize your diet to lower saturated fats.    The cholesterol panel is not ideal, but similar to where this was before.  Please do the CT calcium score to determine if there is any evidence of underlying coronary artery disease.  I will send you those results of that important cardiac test when I see them.    The remainder of the labs including the electrolytes and the vitamin levels are unremarkable.    Sincerely,  Shailesh Eldridge MD

## 2024-01-20 ENCOUNTER — APPOINTMENT (OUTPATIENT)
Dept: RADIOLOGY | Facility: CLINIC | Age: 69
End: 2024-01-20
Payer: COMMERCIAL

## 2024-01-27 ENCOUNTER — HOSPITAL ENCOUNTER (OUTPATIENT)
Dept: RADIOLOGY | Facility: CLINIC | Age: 69
Discharge: HOME | End: 2024-01-27
Payer: COMMERCIAL

## 2024-01-27 DIAGNOSIS — K80.21 CALCULUS OF GALLBLADDER WITH BILIARY OBSTRUCTION BUT WITHOUT CHOLECYSTITIS: Chronic | ICD-10-CM

## 2024-01-27 PROCEDURE — 76700 US EXAM ABDOM COMPLETE: CPT | Performed by: RADIOLOGY

## 2024-01-27 PROCEDURE — 76700 US EXAM ABDOM COMPLETE: CPT

## 2024-01-29 DIAGNOSIS — K80.20 CALCULUS OF GALLBLADDER WITHOUT CHOLECYSTITIS WITHOUT OBSTRUCTION: Primary | ICD-10-CM

## 2024-01-29 DIAGNOSIS — K80.21 CALCULUS OF GALLBLADDER WITH BILIARY OBSTRUCTION BUT WITHOUT CHOLECYSTITIS: ICD-10-CM

## 2024-02-01 ENCOUNTER — APPOINTMENT (OUTPATIENT)
Dept: SURGERY | Facility: CLINIC | Age: 69
End: 2024-02-01
Payer: COMMERCIAL

## 2024-02-08 ENCOUNTER — OFFICE VISIT (OUTPATIENT)
Dept: SURGERY | Facility: CLINIC | Age: 69
End: 2024-02-08
Payer: COMMERCIAL

## 2024-02-08 VITALS
RESPIRATION RATE: 16 BRPM | TEMPERATURE: 98 F | HEIGHT: 71 IN | DIASTOLIC BLOOD PRESSURE: 101 MMHG | HEART RATE: 90 BPM | WEIGHT: 175 LBS | OXYGEN SATURATION: 97 % | SYSTOLIC BLOOD PRESSURE: 168 MMHG | BODY MASS INDEX: 24.5 KG/M2

## 2024-02-08 DIAGNOSIS — K80.21 CALCULUS OF GALLBLADDER WITH BILIARY OBSTRUCTION BUT WITHOUT CHOLECYSTITIS: Chronic | ICD-10-CM

## 2024-02-08 PROCEDURE — 1159F MED LIST DOCD IN RCRD: CPT | Performed by: SURGERY

## 2024-02-08 PROCEDURE — 1126F AMNT PAIN NOTED NONE PRSNT: CPT | Performed by: SURGERY

## 2024-02-08 PROCEDURE — 1123F ACP DISCUSS/DSCN MKR DOCD: CPT | Performed by: SURGERY

## 2024-02-08 PROCEDURE — 3077F SYST BP >= 140 MM HG: CPT | Performed by: SURGERY

## 2024-02-08 PROCEDURE — 99204 OFFICE O/P NEW MOD 45 MIN: CPT | Performed by: SURGERY

## 2024-02-08 PROCEDURE — 1160F RVW MEDS BY RX/DR IN RCRD: CPT | Performed by: SURGERY

## 2024-02-08 PROCEDURE — 3080F DIAST BP >= 90 MM HG: CPT | Performed by: SURGERY

## 2024-02-08 PROCEDURE — 1036F TOBACCO NON-USER: CPT | Performed by: SURGERY

## 2024-02-08 NOTE — PROGRESS NOTES
Taco Hemphillonnor   [id number]   1955         Chief Complaint/          Gallstones        HPI/    68-year-old male seen for an opinion regarding gallstones.    Patient is known about gallstones for several years.  In the past he had multiple episodes of the right upper quadrant pain.  It was in the emergency room several times.  Each episode lasted several hours, associated with nausea and vomiting, and significant pain.    I identified at least 3 ultrasounds carried out over the last year that demonstrated cholelithiasis.    Patient states that after his last visit episode of pain about 8-10 months ago he changed his diet.  He states that he went to a low-fat diet and since that time has not had any other significant pain.  He also states that he used to drink more.  When he changed his diet he stopped drinking.  Again he denies any recent symptoms    Currently denies any jaundice or dark urine    Past Medical History:   Diagnosis Date    Essential (primary) hypertension 08/05/2019    Benign essential hypertension    Impacted cerumen, bilateral 03/18/2022    Excessive cerumen in both ear canals    Personal history of diseases of the skin and subcutaneous tissue 10/05/2015    History of rosacea    Personal history of other diseases of the digestive system 03/06/2019    History of right inguinal hernia    Personal history of other diseases of the digestive system 11/28/2018    History of right inguinal hernia    Personal history of other diseases of the nervous system and sense organs 10/05/2015    History of cataract    Personal history of other specified conditions 11/10/2015    History of weakness    Unspecified conjunctivitis 10/05/2015    Conjunctivitis of left eye      Hypertension    Denies diabetes or coronary artery disease  Social History     Socioeconomic History    Marital status:      Spouse name: Not on file    Number of children: Not on file    Years of education: Not on file    Highest education  level: Not on file   Occupational History    Not on file   Tobacco Use    Smoking status: Never     Passive exposure: Never    Smokeless tobacco: Never   Substance and Sexual Activity    Alcohol use: Yes     Alcohol/week: 7.0 standard drinks of alcohol     Types: 7 Cans of beer per week     Comment: 6-8 beers/week    Drug use: Never    Sexual activity: Not on file   Other Topics Concern    Not on file   Social History Narrative    Not on file     Social Determinants of Health     Financial Resource Strain: Not on file   Food Insecurity: Not on file   Transportation Needs: Not on file   Physical Activity: Not on file   Stress: Not on file   Social Connections: Not on file   Intimate Partner Violence: Not on file   Housing Stability: Not on file          Family History   Problem Relation Name Age of Onset    No Known Problems Mother           at 88    Cirrhosis Father           at 59    Alcohol abuse Father      Other (Dialysis) Brother           at 59-on dialysis from hypertension history of drug abuse    Drug abuse Brother      Hypertension Brother          Review of Systems   All other systems reviewed and are negative.         Current Outpatient Medications:     aspirin 81 mg EC tablet, Take 1 tablet (81 mg) by mouth 1 time., Disp: , Rfl:     atorvastatin (Lipitor) 10 mg tablet, TAKE 1 TABLET BY MOUTH EVERY DAY, Disp: 90 tablet, Rfl: 0    carvedilol (Coreg) 12.5 mg tablet, Start with 1/2 pill every 12 hours for 1 week, then 1 pill twice daily for high blood pressure, Disp: 60 tablet, Rfl: 11     Allergies   Allergen Reactions    Losartan-Hydrochlorothiazide Shortness of breath        US abdomen complete  Narrative: Interpreted By:  Oswaldo Barillas,   STUDY:  US ABDOMEN COMPLETE;  2024 10:25 am      INDICATION:  69 y/o   M with  Signs/Symptoms:recurrent cholecystitis.      COMPARISON:  None.      ACCESSION NUMBER(S):  [id number]      ORDERING CLINICIAN:  MARIAH SANCHEZ      TECHNIQUE:  Routine  "ultrasound of the abdomen was performed.   Static images were  obtained for remote interpretation.      FINDINGS:  LIVER:  16.4 cm in length. No focal abnormality. Normal hepatic echogenicity.      GALLBLADDER:  Evaluation limited by lack of distention. Cholelithiasis. No  gallbladder wall thickening. Negative sonographic Flannery's sign.      BILIARY SYSTEM:  Nondilated.      PANCREAS:  Visualized portion of the body unremarkable. Remainder obscured by  overlying bowel gas.      RIGHT KIDNEY:  12.8 cm in length. No hydronephrosis. No focal renal abnormality.      LEFT KIDNEY:  13.3 cm in length. No hydronephrosis. Multiple parapelvic cysts  measuring up to 1.2 cm.      SPLEEN:  11 cm in length. Echogenic foci suggestive of calcifications.      AORTA AND IVC:  Normal in caliber where visualized.      BLADDER:  Unremarkable for degree of distention.      Impression: Cholelithiasis. No mobility is seen however evaluation is limited by  contracted gallbladder.      Left renal parapelvic cysts.      Signed by: Oswaldo Barillas 1/29/2024 10:07 AM  Dictation workstation:   [id number]       [unfilled]     BP (!) 168/101 (BP Location: Left arm, Patient Position: Sitting, BP Cuff Size: Adult)   Pulse 90   Temp 36.7 °C (98 °F) (Temporal)   Resp 16   Ht 1.803 m (5' 11\")   Wt 79.4 kg (175 lb)   SpO2 97%   BMI 24.41 kg/m²        Physical Exam  Constitutional:       Appearance: Normal appearance.   HENT:      Head: Normocephalic and atraumatic.   Cardiovascular:      Rate and Rhythm: Normal rate and regular rhythm.   Pulmonary:      Effort: Pulmonary effort is normal.      Breath sounds: Normal breath sounds.   Abdominal:      Comments: Soft, nontender    No Flannery sign    No hepatosplenomegaly    No umbilical hernia   Musculoskeletal:      Cervical back: Normal range of motion.   Skin:     General: Skin is warm.   Neurological:      General: No focal deficit present.      Mental Status: He is alert and oriented to " person, place, and time.                  Assessment/    Symptomatic gallstones    Plan/    Although the patient has had much fewer symptoms since he has changed his diet and lifestyle, he still has gallstones and I think they are symptomatic.  Standard recommendation for symptomatic cholelithiasis is cholecystectomy    Patient not interested in surgery at this point in time.  I warned him of the dangers of not operating to include possible acute cholecystitis, cholangitis, pancreatitis, higher need for open procedures etc.  Again the patient does not want to have surgery at this point in time.  Patient appears fully competent and his wishes will be respected.    Told him to come see me should he change his mind or have more  symptoms.  Again I recommend surgery.  I warned him of the dangers of not operating

## 2024-02-27 ENCOUNTER — OFFICE VISIT (OUTPATIENT)
Dept: PRIMARY CARE | Facility: CLINIC | Age: 69
End: 2024-02-27
Payer: COMMERCIAL

## 2024-02-27 VITALS
DIASTOLIC BLOOD PRESSURE: 100 MMHG | SYSTOLIC BLOOD PRESSURE: 149 MMHG | OXYGEN SATURATION: 96 % | HEIGHT: 71 IN | TEMPERATURE: 99.5 F | BODY MASS INDEX: 23.66 KG/M2 | WEIGHT: 169 LBS | HEART RATE: 78 BPM | RESPIRATION RATE: 16 BRPM

## 2024-02-27 DIAGNOSIS — H53.9 VISION CHANGES: Primary | ICD-10-CM

## 2024-02-27 DIAGNOSIS — E78.5 DYSLIPIDEMIA, GOAL LDL BELOW 100: ICD-10-CM

## 2024-02-27 DIAGNOSIS — I10 ESSENTIAL HYPERTENSION WITH GOAL BLOOD PRESSURE LESS THAN 130/85: ICD-10-CM

## 2024-02-27 DIAGNOSIS — Z56.6 STRESSFUL JOB: Chronic | ICD-10-CM

## 2024-02-27 DIAGNOSIS — K57.30 DIVERTICULOSIS OF LARGE INTESTINE WITHOUT HEMORRHAGE: Chronic | ICD-10-CM

## 2024-02-27 DIAGNOSIS — R73.03 PREDIABETES: Chronic | ICD-10-CM

## 2024-02-27 DIAGNOSIS — K64.8 INTERNAL HEMORRHOIDS: Chronic | ICD-10-CM

## 2024-02-27 DIAGNOSIS — Z72.820 SLEEP DEPRIVATION: Chronic | ICD-10-CM

## 2024-02-27 PROCEDURE — 1160F RVW MEDS BY RX/DR IN RCRD: CPT | Performed by: INTERNAL MEDICINE

## 2024-02-27 PROCEDURE — 3080F DIAST BP >= 90 MM HG: CPT | Performed by: INTERNAL MEDICINE

## 2024-02-27 PROCEDURE — 1123F ACP DISCUSS/DSCN MKR DOCD: CPT | Performed by: INTERNAL MEDICINE

## 2024-02-27 PROCEDURE — 99214 OFFICE O/P EST MOD 30 MIN: CPT | Performed by: INTERNAL MEDICINE

## 2024-02-27 PROCEDURE — 1036F TOBACCO NON-USER: CPT | Performed by: INTERNAL MEDICINE

## 2024-02-27 PROCEDURE — 1159F MED LIST DOCD IN RCRD: CPT | Performed by: INTERNAL MEDICINE

## 2024-02-27 PROCEDURE — 1126F AMNT PAIN NOTED NONE PRSNT: CPT | Performed by: INTERNAL MEDICINE

## 2024-02-27 PROCEDURE — 3077F SYST BP >= 140 MM HG: CPT | Performed by: INTERNAL MEDICINE

## 2024-02-27 SDOH — HEALTH STABILITY - MENTAL HEALTH: OTHER PHYSICAL AND MENTAL STRAIN RELATED TO WORK: Z56.6

## 2024-02-27 ASSESSMENT — ENCOUNTER SYMPTOMS
LOSS OF SENSATION IN FEET: 0
OCCASIONAL FEELINGS OF UNSTEADINESS: 0
DEPRESSION: 0

## 2024-02-27 NOTE — PROGRESS NOTES
"Subjective   Patient ID: Taco Alvarez is a 68 y.o. male who presents for Follow-up.    Here for follow-up to review his blood pressure.  As it turns out he is only taking the blood pressure, carvedilol once a day  No exertional chest pain, palpitations, dizziness, orthopnea or pedal edema.    5 days a week he goes to work at 2:30 AM getting out of bed.  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  When he gets home he takes a nap he states as they are typically 2 grandchildren there.  He goes to bed around 8 or 9 PM         Review of Systems    Objective   BP (!) 149/100 (BP Location: Left arm, Patient Position: Sitting, BP Cuff Size: Adult)   Pulse 78   Temp 37.5 °C (99.5 °F)   Resp 16   Ht 1.803 m (5' 11\")   Wt 76.7 kg (169 lb)   SpO2 96%   BMI 23.57 kg/m²     Physical Exam  Vitals reviewed.   Constitutional:       Appearance: Normal appearance.   HENT:      Head: Normocephalic and atraumatic.   Eyes:      General: No scleral icterus.        Right eye: No discharge.         Left eye: No discharge.      Extraocular Movements: Extraocular movements intact.      Conjunctiva/sclera: Conjunctivae normal.      Pupils: Pupils are equal, round, and reactive to light.   Cardiovascular:      Rate and Rhythm: Normal rate and regular rhythm.      Pulses: Normal pulses.      Heart sounds: Normal heart sounds. No murmur heard.  Pulmonary:      Effort: Pulmonary effort is normal.      Breath sounds: Normal breath sounds. No wheezing or rhonchi.   Musculoskeletal:         General: No deformity or signs of injury. Normal range of motion.      Cervical back: Normal range of motion and neck supple. No rigidity or tenderness.   Lymphadenopathy:      Cervical: No cervical adenopathy.   Skin:     General: Skin is warm and dry.      Findings: No rash.   Neurological:      General: No focal deficit present.      Mental Status: He is alert and oriented to person, place, and time. Mental status is at " baseline.      Cranial Nerves: No cranial nerve deficit.      Sensory: No sensory deficit.      Gait: Gait normal.   Psychiatric:         Mood and Affect: Mood normal.         Behavior: Behavior normal.         Thought Content: Thought content normal.         Judgment: Judgment normal.         Assessment/Plan   Problem List Items Addressed This Visit             ICD-10-CM    Essential hypertension with goal blood pressure less than 130/85 I10    Dyslipidemia, goal LDL below 100 E78.5    Prediabetes R73.03    Vision changes - Primary H53.9    Relevant Orders    Referral to Ophthalmology    Sleep deprivation Z72.820    Stressful job Z56.6    Internal hemorrhoids K64.8    Diverticulosis of large intestine without hemorrhage K57.30           Noncompliance-he has not followed up here semiannually. He has refused additional blood pressure medication.              1/24-remarkably he has not been here in nearly 2 years-strongly encouraged visits at least semiannually to optimize his care plan             2/24-though he followed up accordingly-he is not taking his carvedilol twice daily as prescribed.  He read the package insert and is concerned about drowsiness.  I told him at low doses this is not normally an issue,    Health care coordination-his wife Peggy was with him at his January visit     Hypertension / white coat effect - intolerant to losartan / hctz - shortness of breath. Presently doing OK w/ atenolol. Blood pressure borderline on recheck. Discussed systolic goal blood pressure under 130. He declined additional medication in the past.   Blood pressure not at goal. He refuses additional medication at this time. Calcium score ordered last time         1/24-blood pressure is very high-I told him this could lead to a stroke or heart attack.  I recommended he discontinue daily atenolol and instead use twice daily carvedilol.  I asked him to return in 6 weeks to review his blood pressure.  CT his calcium score  ordered              2/24-blood pressure quite high on carvedilol once a day.  Strongly urged him to use this twice daily as prescribed.  He will bring his blood pressure machine in next time to check this.         Walking goal-encouraged 30 minutes 5 days weekly suggested last time. He is not doing that presently     Dyslipidemia / elevated 10 year cardiac risk assessment around 12% February 2019-statin and baby aspirin recommended.                     Awaiting follow-up lipids. He has discontinued aspirin with recent press reports.  CT calcium score ordered again 1/24 2/24-calcium score scheduled soon.  LDL 1/24-81    Prediabetes-1/24-A1c stable at 5.8%.  BMI 23.6.  Will follow    Recurrent cholecystitis-January and March 2023.  Remarkably he canceled his planned surgery.  Remarkably his sister has had gallstone pancreatitis.  I recommended an ultrasound and general surgical consultation            2/24-with diet changes no recurrent symptoms    Intermittent arm and leg tingling-history and exam nondiagnostic-he will check labs and set up an EMG study    Excessive fatigue/sleep deprivation-his wife says he has snoring and stops breathing at times at night.  In light of his witnessed apnea-sleep evaluation           2/24-regarding his excessive fatigue-he states that he is quite tired all the time and that is why he will not take his blood pressure medicine more.  I told him sleep deprivation is the main issue at this point and possibly sleep apnea.  In fact, 5 days a week he goes to work at 2:30 AM getting out of bed.  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  When he gets home he takes a nap he states as they are typically 2 grandchildren there.  He goes to bed around 8 or 9 PM.  He will be 69 years old soon and clearly sleep deprivation is an issue.  He will follow-up with the sleep doctor as scheduled next month to consider a sleep study         s/p right hernia  repair Jan '19 per Dr. Smith / history of left inguinal hernia repair 1980s-he is doing well presently He will f/u with Dr. Smith with concerns.     acid reflux - occas. OTC pepcid used     winter hand exzema - cream used in winter. saw Dr. Quintana in early 2017. Presently topical creams used as needed. Encourage this especially in the wintertime.     BPH-annual PSA continues each February for prostate cancer screening.                    As he does not come back regularly, PSA overdue.  Ordered    Diverticulosis/internal hemorrhoids/colonoscopy care-colonoscopy updated 7/23-internal hemorrhoids and pancolonic diverticulosis.  Encouraged ample fluid.              Next colonoscopy recommended 10 years-7/33     knee arthritis-caution with overactivity. Right knee remains problematic occasionally     History of partial right rotator cuff tear-he is tolerating occasional shoulder pain.    Doing well for the most part though - rarely sore; limited strength tho.     Hearing concerns-his wife, Peggy notes that his hearing is a bit diminished. At this point he doesn't want to consider audiology consultation     History Bilateral cerumen - noted on recent DOT exam. Last time suggested Debrox drops twice a day for 7-10 days and then follow-up for ear lavage. Asymptomatic. Once again explained home routine he would rather do this then see ENT last time.   ENT evaluation to remove cerumen recommended     dental care - he follows w/ regular dentist twice yearly, as well as a gum specialist twice yearly     Vision concerns-reading glasses /history of early cataract- last visit w/ Dr. Reid 2015. Encouraged annual eye exam-ordered            Slick cataracts bilateral noted at his last appointment with Dr. Yepez in optometry in Feb '20.  Encouraged him to set up an appointment to see her.  Remarkably initially he said he did not have cataracts but I showed him her office note documenting this-strongly encouraged him to  get in to get seen             He notes his DOT exams only test for distance vision.  Encouraged him to follow-up with his eye provider nevertheless.        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 For the most part doing well presently. Maybe going to 4 days / week in 2022, but still works 50 hours most weeks            2/24-more job stress after the  was killed last month driving his truck.  He worked for their firm.     Caregiving concerns-his wife has been diagnosed with a sinus fungal mass which has been removed in ENT. Support provided she has a lot of health concerns with her rheumatology medications and immunosuppressed state. Lots of medical bills so he continues to work overtime to help.     DOT exam - annually w/ HTN. He goes to [place]. updated Feb 2022.  This is every 2 years.     Flu shot encouraged each fall-- he declines     Discussed Shingrix / new shingles shot - 2 shot series w/ limited availability. Encouraged patient to consider getting this when/ where available.      pneumovax updated today     Covid series-completed; booster too     Follow-up semianually, sooner with concerns-after visit in 6 weeks to reassess his blood pressure labs and abdominal ultrasound results     Charting was completed using voice recognition technology and may include unintended errors.

## 2024-02-28 PROBLEM — Z56.6 STRESSFUL JOB: Status: ACTIVE | Noted: 2024-02-28

## 2024-02-28 PROBLEM — Z72.820 SLEEP DEPRIVATION: Status: ACTIVE | Noted: 2024-02-28

## 2024-02-28 PROBLEM — K57.30 DIVERTICULOSIS OF LARGE INTESTINE WITHOUT HEMORRHAGE: Status: ACTIVE | Noted: 2024-02-28

## 2024-02-28 PROBLEM — R19.4 BOWEL HABIT CHANGES: Status: RESOLVED | Noted: 2023-06-27 | Resolved: 2024-02-28

## 2024-02-28 PROBLEM — K64.8 INTERNAL HEMORRHOIDS: Status: ACTIVE | Noted: 2024-02-28

## 2024-02-28 PROBLEM — J06.9 UPPER RESPIRATORY INFECTION, ACUTE: Status: RESOLVED | Noted: 2023-06-27 | Resolved: 2024-02-28

## 2024-03-01 ENCOUNTER — HOSPITAL ENCOUNTER (OUTPATIENT)
Dept: RADIOLOGY | Facility: CLINIC | Age: 69
Discharge: HOME | End: 2024-03-01
Payer: MEDICARE

## 2024-03-01 DIAGNOSIS — I10 ESSENTIAL HYPERTENSION WITH GOAL BLOOD PRESSURE LESS THAN 130/85: Chronic | ICD-10-CM

## 2024-03-01 DIAGNOSIS — E78.5 DYSLIPIDEMIA, GOAL LDL BELOW 100: Chronic | ICD-10-CM

## 2024-03-01 PROCEDURE — 75571 CT HRT W/O DYE W/CA TEST: CPT

## 2024-03-03 DIAGNOSIS — E78.5 HYPERLIPIDEMIA, UNSPECIFIED HYPERLIPIDEMIA TYPE: ICD-10-CM

## 2024-03-03 DIAGNOSIS — E78.5 DYSLIPIDEMIA, GOAL LDL BELOW 70: Primary | ICD-10-CM

## 2024-03-03 PROBLEM — R93.1 AGATSTON CORONARY ARTERY CALCIUM SCORE BETWEEN 100 AND 199: Status: ACTIVE | Noted: 2024-03-03

## 2024-03-03 RX ORDER — ATORVASTATIN CALCIUM 20 MG/1
20 TABLET, FILM COATED ORAL DAILY
Qty: 90 TABLET | Refills: 3 | Status: SHIPPED | OUTPATIENT
Start: 2024-03-03 | End: 2024-03-03 | Stop reason: SDUPTHER

## 2024-03-03 RX ORDER — ATORVASTATIN CALCIUM 20 MG/1
20 TABLET, FILM COATED ORAL DAILY
Qty: 90 TABLET | Refills: 3 | Status: SHIPPED | OUTPATIENT
Start: 2024-03-03

## 2024-03-03 NOTE — RESULT ENCOUNTER NOTE
Iker    Thank you for doing the CT calcium score.  The radiologist notes that your calcium score is borderline elevated at 100.  This increases the underlying risk for coronary artery disease.  In light of your elevated ASCVD risk at 14.9%, it is very important that you work to optimize/improve your cardiac risks in the future.    Specifically, improving your blood pressure is important.  Please take the medication that we talked about in the office twice daily.  Please see the sleep study doctor about sleep apnea as undiagnosed sleep apnea can elevate the blood pressure.    Please increase the cholesterol pill-atorvastatin from 10 to 20 mg daily.  I sent this new size to your pharmacy.  You can simply use up your present supply by taking 2 of the 10 mg tablets once a day until that supply runs out.    Finally, please take a baby aspirin daily.  This will also help lower your heart risk in the future.    Thanks again for doing the labs.  Please do not hesitate to contact me with concerns.    Sincerely,  Shailesh Eldridge MD

## 2024-03-06 DIAGNOSIS — R93.89 ABNORMAL CT OF THE CHEST: Primary | ICD-10-CM

## 2024-03-07 NOTE — RESULT ENCOUNTER NOTE
Iker    I wanted to let you know that your calcium score was reviewed by additional radiologist, who felt that there were some calcified lymph nodes which may have been from a old respiratory infection, which was termed a granulomatous infection sometime years ago.  There appears that there are also calcified granulomas in the liver and spleen which is not an uncommon finding.    Additionally the radiologist notes that there is some scattered findings that may suggest evidence of lung fibrosis.  It is unclear if this is a new finding, as we do not have any old CAT scans to compare this with.  I would suggest that you review this CAT scan finding with a pulmonologist to see if any additional testing is needed.  To set up that pulmonary consultation-please call scheduling at [phone].    Thanks for addressing this matter.    Sincerely,  Shailesh Eldridge MD

## 2024-03-13 NOTE — PROGRESS NOTES
"   Patient: Taco Alvarez  : 1955 AGE: 68 y.o. SEX:male   MRN: [id number]   Provider: RIMA Meléndez     [place] [facility] [place] [facility]   Service Date: 3/13/2024     PCP: Shailesh Eldridge MD   Referred by: Shailesh Eldridge MD          [facility]  New Visit Note      HISTORY OF PRESENT ILLNESS     Taco Alvarez is a 68 y.o. male who presents to [facility] for a comprehensive sleep medicine evaluation with concerns of FABIOLA evaluation in the setting of uncontrolled HTN. Patient reports       The patient has h/o Hypertension and DLD, GERD, arthritis, prediabetes, eczema         {OSAhx:12661}    SLEEP STUDY HISTORY (personally reviewed raw data such as interpretation report, data sheet, hypnogram, and titration table if available and applicable)  ***    SLEEP-WAKE SCHEDULE    Sleep schedule  on weekdays / work days:  Usual Bedtime:  ***  Subjective sleep latency: ***  # of awakenings: {EY Nocturnal awakenin::\"Yes, about *** times a night\"}  Wake time:  ***  Naps: {Nap yes or no:08422}  Average sleep duration (excluding naps): ***    Sleep schedule on weekends/non work days :  {Weekend Sleep Schedule:83173::\"same as weekdays\"}  Usual Bedtime:   ***  Subjective sleep latency: ***  # of awakenings: {EY Nocturnal awakenin::\"Yes, about *** times a night\"}  Wake time:  ***  Average sleep duration (excluding naps): ***    SLEEP ENVIRONMENT  Sleep status: {sleep status:70914}  Preferred sleep position: {Sleep position:37762}  Room is dark: Yes  Room is quiet: Yes  Room is cool: Yes  Bed comfort: good    SLEEP HABITS  Activities in bed: ***  Clockwatching: ***  Caffeine consumption: {yes/no:09049}  Alcohol consumption: {yes/no:28537}  Smoking: {yes/no:73486}  Marijuana: {yes/no:10011}  Sleep aids: ***    SLEEP ROS  Sleep Initiation: {Sleep initiation:65461}  Problems going to sleep associated with: {Prob Falling Asleep:73025}    Sleep " Maintenance: {Sleep Maintenance:31101}  Problems staying asleep associated with: {Problems Staying Asleep:08490}    Breathing during sleep: {Breathing during sleep:48538}      Sleep-related behaviors: {Denies (Optional):19505}  {Reports:12302}    Daytime Symptoms  On awakening patient reports: {Morning Symptoms:66043}    Patient report some daytime symptoms including: {DAYTIME SYMPTOMS:43727:s}  Patient denies daytime symptoms including: {Denies:88757:s}  Fatigue: {EY Fatigue symptoms:05186}    RLS screen: {RLS symptoms:96738}    WEIGHT: {weight:42252}    ESS:    VIVIAN:    STOP-BANG: ***    REVIEW OF SYSTEMS     All other systems have been reviewed and are negative.    ALLERGIES     Allergies   Allergen Reactions    Losartan-Hydrochlorothiazide Shortness of breath       MEDICATIONS     Current Outpatient Medications   Medication Sig Dispense Refill    aspirin 81 mg EC tablet Take 1 tablet (81 mg) by mouth 1 time.      atorvastatin (Lipitor) 20 mg tablet Take 1 tablet (20 mg) by mouth once daily. 90 tablet 3    carvedilol (Coreg) 12.5 mg tablet Start with 1/2 pill every 12 hours for 1 week, then 1 pill twice daily for high blood pressure 60 tablet 11     No current facility-administered medications for this visit.       PAST HISTORIES     PERTINENT PAST MEDICAL HISTORY: See HPI    PERTINENT PAST SURGICAL HISTORY for Sleep Medicine:  {surgical history pertinent in sleep:94719}    PERTINENT FAMILY HISTORY for Sleep Medicine:  {family history in sleep:71440}    PERTINENT SOCIAL HISTORY:  He  reports that he has never smoked. He has never been exposed to tobacco smoke. He has never used smokeless tobacco. He reports current alcohol use of about 7.0 standard drinks of alcohol per week. He reports that he does not use drugs. He currently lives {living status:44275} and {current employment status:75803}    Active Problems, Allergy List, Medication List, and PMH/PSH/FH/Social Hx have been reviewed and reconciled in chart. No  significant changes unless documented in the pertinent chart section. Updates made when necessary.     PHYSICAL EXAM     VITAL SIGNS: There were no vitals taken for this visit.    NECK CIRCUMFERENCE: ***    CURRENT WEIGHT: There were no vitals filed for this visit.     PREVIOUS WEIGHTS:  Wt Readings from Last 3 Encounters:   02/27/24 76.7 kg (169 lb)   02/08/24 79.4 kg (175 lb)   01/15/24 76.6 kg (168 lb 12.8 oz)       Physical Exam  Constitutional: Awake, not in distress  Lungs: Clear to auscultation bilateral, no cough noted  Heart: Regular rate and rhythm  Skin: Warm, no rash  Neuro: No tremors, moves all extremities  Psych: alert and oriented to time, place, and person    HEENT:   Tonsils enlargement grade 1+   Airway comments: narrow lateral walls   Tongue scalloping: slight   Modified Mallampati score - ***        RESULTS/DATA     Iron (ug/dL)   Date Value   01/15/2024 105     % Saturation (%)   Date Value   01/15/2024 35     TIBC (ug/dL)   Date Value   01/15/2024 297       Bicarbonate   Date Value Ref Range Status   01/15/2024 29 21 - 32 mmol/L Final       ASSESSMENT/PLAN     Mr. Alvarez is a 68 y.o. male and He was referred to the [facility] for evaluation of possible sleep disordered breathing    Problem List, Orders, Assessment, Recommendations:    #Sleep disordered breathing/suspected FABIOLA  - Due to high pre-test probability without significant medical comorbidity or possible concomitant sleep disorder, I recommend home sleep apnea testing to evaluate for FABIOLA  - Due to XX, I recommend split-night PSG (Split if AHI>15 or >10) to evaluate for FABIOLA.  - Follow up after sleep study to review results and determine plan of care  -Diet, exercise, and weight loss were emphasized today in clinic, as were non-supine sleep, avoiding alcohol in the late evening, and driving or operating heavy machinery when sleepy.       #HTN  BP Readings from Last 1 Encounters:   02/27/24 (!) 149/100  "    - doing well, asymptomatic, denies any headache, blurry vision, chest pain, palpitation, dizziness, lightheadedness, or syncopal episodes  - discussed at length the impact of untreated FABIOLA and BP control  - supportive management: low salt DASH diet (less than 2000 mg sodium intake daily), moderate intensity aerobic exercise at least 30 minutes 5 days per week, reduce stress, quit smoking, limit alcohol, lose weight, and monitor BP once daily  - continue current management and follow-up with PCP       All of patient's questions were answered. He verbalizes understanding and agreement with my assessment and plan.    Disposition    {Disposition choice:01250::\"Will call with sleep study results and determine F/U plan\"}   "

## 2024-03-14 ENCOUNTER — APPOINTMENT (OUTPATIENT)
Dept: SLEEP MEDICINE | Facility: CLINIC | Age: 69
End: 2024-03-14
Payer: COMMERCIAL

## 2024-07-02 ASSESSMENT — PROMIS GLOBAL HEALTH SCALE
CARRYOUT_SOCIAL_ACTIVITIES: EXCELLENT
RATE_MENTAL_HEALTH: EXCELLENT
RATE_GENERAL_HEALTH: VERY GOOD
RATE_QUALITY_OF_LIFE: EXCELLENT
RATE_PHYSICAL_HEALTH: VERY GOOD
CARRYOUT_PHYSICAL_ACTIVITIES: COMPLETELY
RATE_SOCIAL_SATISFACTION: EXCELLENT
RATE_AVERAGE_PAIN: 0
EMOTIONAL_PROBLEMS: RARELY

## 2024-07-03 ENCOUNTER — APPOINTMENT (OUTPATIENT)
Dept: PRIMARY CARE | Facility: CLINIC | Age: 69
End: 2024-07-03
Payer: COMMERCIAL

## 2024-07-03 VITALS
SYSTOLIC BLOOD PRESSURE: 136 MMHG | HEIGHT: 70 IN | TEMPERATURE: 96.7 F | WEIGHT: 161 LBS | RESPIRATION RATE: 16 BRPM | BODY MASS INDEX: 23.05 KG/M2 | OXYGEN SATURATION: 98 % | DIASTOLIC BLOOD PRESSURE: 96 MMHG | HEART RATE: 67 BPM

## 2024-07-03 DIAGNOSIS — I10 ESSENTIAL HYPERTENSION WITH GOAL BLOOD PRESSURE LESS THAN 130/85: Chronic | ICD-10-CM

## 2024-07-03 DIAGNOSIS — E78.5 DYSLIPIDEMIA, GOAL LDL BELOW 100: Chronic | ICD-10-CM

## 2024-07-03 DIAGNOSIS — J84.10 PULMONARY FIBROSIS (MULTI): Chronic | ICD-10-CM

## 2024-07-03 DIAGNOSIS — Z00.00 ANNUAL PHYSICAL EXAM: Primary | Chronic | ICD-10-CM

## 2024-07-03 DIAGNOSIS — N40.1 BPH ASSOCIATED WITH NOCTURIA: Chronic | ICD-10-CM

## 2024-07-03 DIAGNOSIS — E55.9 VITAMIN D DEFICIENCY: Chronic | ICD-10-CM

## 2024-07-03 DIAGNOSIS — R35.1 BPH ASSOCIATED WITH NOCTURIA: Chronic | ICD-10-CM

## 2024-07-03 DIAGNOSIS — R73.03 PREDIABETES: Chronic | ICD-10-CM

## 2024-07-03 PROCEDURE — 3080F DIAST BP >= 90 MM HG: CPT | Performed by: INTERNAL MEDICINE

## 2024-07-03 PROCEDURE — 3075F SYST BP GE 130 - 139MM HG: CPT | Performed by: INTERNAL MEDICINE

## 2024-07-03 PROCEDURE — 1160F RVW MEDS BY RX/DR IN RCRD: CPT | Performed by: INTERNAL MEDICINE

## 2024-07-03 PROCEDURE — 1123F ACP DISCUSS/DSCN MKR DOCD: CPT | Performed by: INTERNAL MEDICINE

## 2024-07-03 PROCEDURE — 1159F MED LIST DOCD IN RCRD: CPT | Performed by: INTERNAL MEDICINE

## 2024-07-03 PROCEDURE — 99397 PER PM REEVAL EST PAT 65+ YR: CPT | Performed by: INTERNAL MEDICINE

## 2024-07-03 RX ORDER — ASPIRIN 81 MG/1
TABLET ORAL
Qty: 90 TABLET | Refills: 3 | Status: SHIPPED | OUTPATIENT
Start: 2024-07-03

## 2024-07-03 ASSESSMENT — PATIENT HEALTH QUESTIONNAIRE - PHQ9
SUM OF ALL RESPONSES TO PHQ9 QUESTIONS 1 AND 2: 0
1. LITTLE INTEREST OR PLEASURE IN DOING THINGS: NOT AT ALL
2. FEELING DOWN, DEPRESSED OR HOPELESS: NOT AT ALL

## 2024-07-03 NOTE — PROGRESS NOTES
"Subjective   Patient ID: Taco Alvarez is a 69 y.o. male who presents for Annual Exam (Patient here for a physical ).    Here for wellness visit  Feels well.  No recent illnesses or injuries  No exertional chest pain, palpitations, dizziness, orthopnea or pedal edema.         Review of Systems    Objective   BP (!) 136/96   Pulse 67   Temp 35.9 °C (96.7 °F)   Resp 16   Ht 1.778 m (5' 10\")   Wt 73 kg (161 lb)   SpO2 98%   BMI 23.10 kg/m²     Physical Exam  Constitutional:       Appearance: Normal appearance.   HENT:      Head: Normocephalic and atraumatic.      Right Ear: Tympanic membrane normal.      Left Ear: Tympanic membrane normal.      Nose: Nose normal.   Eyes:      General: No scleral icterus.     Extraocular Movements: Extraocular movements intact.      Conjunctiva/sclera: Conjunctivae normal.      Pupils: Pupils are equal, round, and reactive to light.   Cardiovascular:      Rate and Rhythm: Normal rate and regular rhythm.      Pulses: Normal pulses.      Heart sounds: Normal heart sounds. No murmur heard.  Pulmonary:      Effort: Pulmonary effort is normal. No respiratory distress.      Breath sounds: Normal breath sounds. No stridor. No wheezing.   Abdominal:      General: Abdomen is flat. Bowel sounds are normal. There is no distension.      Palpations: Abdomen is soft. There is no mass.      Tenderness: There is no abdominal tenderness. There is no guarding.   Musculoskeletal:         General: No swelling, tenderness or deformity. Normal range of motion.      Cervical back: Normal range of motion and neck supple. No tenderness.   Lymphadenopathy:      Cervical: No cervical adenopathy.   Skin:     General: Skin is warm and dry.      Findings: No lesion or rash.   Neurological:      General: No focal deficit present.      Mental Status: He is alert and oriented to person, place, and time.      Cranial Nerves: No cranial nerve deficit.      Motor: No weakness.   Psychiatric:         Mood and " Affect: Mood normal.         Behavior: Behavior normal.         Thought Content: Thought content normal.         Judgment: Judgment normal.         Assessment/Plan   Problem List Items Addressed This Visit             ICD-10-CM    Essential hypertension with goal blood pressure less than 130/85 I10    Relevant Medications    aspirin 81 mg EC tablet    Other Relevant Orders    Referral to Cardiology    Basic Metabolic Panel    Prediabetes R73.03    Relevant Orders    Hemoglobin A1C    Vitamin D deficiency - Primary E55.9    Relevant Orders    Vitamin D 25-Hydroxy,Total (for eval of Vitamin D levels)    BPH associated with nocturia N40.1, R35.1    Relevant Orders    Prostate Specific Antigen     Other Visit Diagnoses         Codes    Dyslipidemia, goal LDL below 100     E78.5    Relevant Orders    Lipid Panel    Alanine Aminotransferase    Pulmonary fibrosis (Multi)     J84.10    Relevant Orders    Referral to Pulmonology             Portions of this encounter note have been copied from my previous note dated  2/27/24 , which have been updated where appropriate and all reflect my current medical decision making from today.     Noncompliance-he has not followed up here semiannually. He has refused additional blood pressure medication.              1/24-remarkably he has not been here in nearly 2 years-strongly encouraged visits at least semiannually to optimize his care plan             2/24-though he followed up accordingly-he is not taking his carvedilol twice daily as prescribed.  He read the package insert and is concerned about drowsiness.  I told him at low doses this is not normally an issue,             7/24; hasn't set up pulm appt. he is not taking his medication accordingly    Health care coordination-his wife Peggy was with him at his January visit     Hypertension / white coat effect - intolerant to losartan / hctz - shortness of breath. Presently doing OK w/ atenolol. Blood pressure borderline on  recheck. Discussed systolic goal blood pressure under 130. He declined additional medication in the past.   Blood pressure not at goal. He refuses additional medication at this time. Calcium score ordered last time         1/24-blood pressure is very high-I told him this could lead to a stroke or heart attack.  I recommended he discontinue daily atenolol and instead use twice daily carvedilol.  I asked him to return in 6 weeks to review his blood pressure.  CT his calcium score ordered              2/24-blood pressure quite high on carvedilol once a day.  Strongly urged him to use this twice daily as prescribed.  He will bring his blood pressure machine in next time to check this.              7/24-his blood pressure is remarkably high in the office.  Though it improves it still high with a diastolic at 96.  Strongly encouraged more consistent medication use accordingly.  I asked him to meet with cardiology to review all of this in further detail    Elevated calcium score at 100 Jan '24 7/24 10 yr cardiac risk 16.4%.  I asked him to meet with cardiology to review all of this in further detail     Walking goal-encouraged 30 minutes 5 days weekly suggested last time. He is not doing that presently     Dyslipidemia / elevated 10 year cardiac risk assessment around 12% February 2019-statin and baby aspirin recommended.                     Awaiting follow-up lipids. He has discontinued aspirin with recent press reports.  CT calcium score ordered again 1/24 2/24-calcium score scheduled soon.  LDL 1/24-81    Prediabetes-1/24-A1c stable at 5.8%.  BMI 23.6.  Will follow      Pulmonary fibrosis concern-noted on his CT-pulmonary consultation ordered-not yet done             7/24-pulmonary consultation ordered again.  Encouraged him to do this.    Recurrent cholecystitis-January and March 2023.  Remarkably he canceled his planned surgery.  Remarkably his sister has had gallstone pancreatitis.  I  recommended an ultrasound and general surgical consultation            2/24-with diet changes no recurrent symptoms    Intermittent arm and leg tingling-history and exam nondiagnostic-he will check labs and set up an EMG study    Excessive fatigue/sleep deprivation-his wife says he has snoring and stops breathing at times at night.  In light of his witnessed apnea-sleep evaluation           2/24-regarding his excessive fatigue-he states that he is quite tired all the time and that is why he will not take his blood pressure medicine more.  I told him sleep deprivation is the main issue at this point and possibly sleep apnea.  In fact, 5 days a week he goes to work at 2:30 AM getting out of bed.  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  When he gets home he takes a nap he states as they are typically 2 grandchildren there.  He goes to bed around 8 or 9 PM.  He will be 69 years old soon and clearly sleep deprivation is an issue.  He will follow-up with the sleep doctor as scheduled next month to consider a sleep study             7/24-he opted not to see the sleep provider         s/p right hernia repair Jan '19 per Dr. Smith / history of left inguinal hernia repair 1980s-he is doing well presently He will f/u with Dr. Smith with concerns.     acid reflux - occas. OTC pepcid used     winter hand exzema - cream used in winter. saw Dr. Quintana in early 2017. Presently topical creams used as needed. Encourage this especially in the wintertime.     BPH-annual PSA continues             PSA ok Jan '24    Diverticulosis/internal hemorrhoids/colonoscopy care-colonoscopy updated 7/23-internal hemorrhoids and pancolonic diverticulosis.  Encouraged ample fluid.              Next colonoscopy recommended 10 years-7/33     knee arthritis-caution with overactivity. Right knee remains problematic occasionally     History of partial right rotator cuff tear-he is tolerating occasional shoulder  pain.    Doing well for the most part though - rarely sore; limited strength tho.     Hearing concerns-his wife, Peggy notes that his hearing is a bit diminished. At this point he doesn't want to consider audiology consultation     History Bilateral cerumen - noted on recent DOT exam. Last time suggested Debrox drops twice a day for 7-10 days and then follow-up for ear lavage. Asymptomatic. Once again explained home routine he would rather do this then see ENT last time.   ENT evaluation to remove cerumen recommended     dental care - he follows w/ regular dentist twice yearly, as well as a gum specialist twice yearly     Vision concerns-reading glasses /history of early cataract- last visit w/ Dr. Reid 2015. Encouraged annual eye exam-ordered            [place] cataracts bilateral noted at his last appointment with Dr. Yepez in optometry in Feb '20.  Encouraged him to set up an appointment to see her.  Remarkably initially he said he did not have cataracts but I showed him her office note documenting this-strongly encouraged him to get in to get seen             He notes his DOT exams only test for distance vision.  Encouraged him to follow-up with his eye provider nevertheless.        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 For the most part doing well presently. Maybe going to 4 days / week in 2022, but still works 50 hours most weeks            2/24-more job stress after the  was killed last month driving his truck.  He worked for their firm.     Caregiving concerns-his wife has been diagnosed with a sinus fungal mass which has been removed in ENT. Support provided she has a lot of health concerns with her rheumatology medications and immunosuppressed state. Lots of medical bills so he continues to work overtime to help.     DOT exam - annually w/ HTN. He goes to [place]. updated Feb 2022.  This is every 2 years.                Next in Mar '25     Flu shot  encouraged each fall-- he declines     Discussed Shingrix 7/24 - declined    RSV vacc - discussed / declined 7/24     pneumovax updated 2/24     Covid series-completed; booster encouraged / declined     Follow-up semianually, sooner with concerns     Charting was completed using voice recognition technology and may include unintended errors.

## 2024-07-07 PROBLEM — Z00.00 ANNUAL PHYSICAL EXAM: Status: ACTIVE | Noted: 2024-07-07

## 2024-07-07 PROBLEM — J84.10 PULMONARY FIBROSIS (MULTI): Status: ACTIVE | Noted: 2024-07-07

## 2024-08-27 DIAGNOSIS — I10 ESSENTIAL HYPERTENSION WITH GOAL BLOOD PRESSURE LESS THAN 130/85: Chronic | ICD-10-CM

## 2024-08-27 RX ORDER — CARVEDILOL 12.5 MG/1
TABLET ORAL
Qty: 180 TABLET | Refills: 3 | Status: SHIPPED | OUTPATIENT
Start: 2024-08-27

## 2024-11-18 DIAGNOSIS — I10 ESSENTIAL HYPERTENSION WITH GOAL BLOOD PRESSURE LESS THAN 130/85: Chronic | ICD-10-CM

## 2024-11-18 RX ORDER — CARVEDILOL 12.5 MG/1
12.5 TABLET ORAL EVERY 12 HOURS
Qty: 180 TABLET | Refills: 1 | Status: SHIPPED | OUTPATIENT
Start: 2024-11-18

## 2024-11-18 NOTE — TELEPHONE ENCOUNTER
"Pharmacy needs an updated \"sig\"/instructions for this medication. Does the pt take 2 tablets daily?   "

## 2025-01-06 ENCOUNTER — LAB (OUTPATIENT)
Dept: LAB | Facility: LAB | Age: 70
End: 2025-01-06
Payer: COMMERCIAL

## 2025-01-06 ENCOUNTER — APPOINTMENT (OUTPATIENT)
Dept: PRIMARY CARE | Facility: CLINIC | Age: 70
End: 2025-01-06
Payer: COMMERCIAL

## 2025-01-06 VITALS
OXYGEN SATURATION: 99 % | WEIGHT: 159.8 LBS | HEART RATE: 97 BPM | BODY MASS INDEX: 22.88 KG/M2 | DIASTOLIC BLOOD PRESSURE: 94 MMHG | SYSTOLIC BLOOD PRESSURE: 144 MMHG | HEIGHT: 70 IN

## 2025-01-06 DIAGNOSIS — N40.1 BPH ASSOCIATED WITH NOCTURIA: Chronic | ICD-10-CM

## 2025-01-06 DIAGNOSIS — I10 ESSENTIAL HYPERTENSION WITH GOAL BLOOD PRESSURE LESS THAN 130/85: Chronic | ICD-10-CM

## 2025-01-06 DIAGNOSIS — R73.03 PREDIABETES: Chronic | ICD-10-CM

## 2025-01-06 DIAGNOSIS — E78.5 DYSLIPIDEMIA, GOAL LDL BELOW 100: Chronic | ICD-10-CM

## 2025-01-06 DIAGNOSIS — E55.9 VITAMIN D DEFICIENCY: Chronic | ICD-10-CM

## 2025-01-06 DIAGNOSIS — R09.89 LABILE HYPERTENSION: Primary | ICD-10-CM

## 2025-01-06 DIAGNOSIS — R35.1 BPH ASSOCIATED WITH NOCTURIA: Chronic | ICD-10-CM

## 2025-01-06 LAB
25(OH)D3 SERPL-MCNC: 33 NG/ML (ref 30–100)
ALT SERPL W P-5'-P-CCNC: 13 U/L (ref 10–52)
ANION GAP SERPL CALC-SCNC: 10 MMOL/L (ref 10–20)
BUN SERPL-MCNC: 16 MG/DL (ref 6–23)
CALCIUM SERPL-MCNC: 9.6 MG/DL (ref 8.6–10.6)
CHLORIDE SERPL-SCNC: 103 MMOL/L (ref 98–107)
CHOLEST SERPL-MCNC: 182 MG/DL (ref 0–199)
CHOLESTEROL/HDL RATIO: 2.6
CO2 SERPL-SCNC: 30 MMOL/L (ref 21–32)
CREAT SERPL-MCNC: 0.93 MG/DL (ref 0.5–1.3)
EGFRCR SERPLBLD CKD-EPI 2021: 89 ML/MIN/1.73M*2
EST. AVERAGE GLUCOSE BLD GHB EST-MCNC: 123 MG/DL
GLUCOSE SERPL-MCNC: 102 MG/DL (ref 74–99)
HBA1C MFR BLD: 5.9 %
HDLC SERPL-MCNC: 69.2 MG/DL
LDLC SERPL CALC-MCNC: 102 MG/DL
NON HDL CHOLESTEROL: 113 MG/DL (ref 0–149)
POTASSIUM SERPL-SCNC: 4.8 MMOL/L (ref 3.5–5.3)
PSA SERPL-MCNC: 0.68 NG/ML
SODIUM SERPL-SCNC: 138 MMOL/L (ref 136–145)
TRIGL SERPL-MCNC: 56 MG/DL (ref 0–149)
VLDL: 11 MG/DL (ref 0–40)

## 2025-01-06 PROCEDURE — 80048 BASIC METABOLIC PNL TOTAL CA: CPT

## 2025-01-06 PROCEDURE — 83036 HEMOGLOBIN GLYCOSYLATED A1C: CPT

## 2025-01-06 PROCEDURE — 80061 LIPID PANEL: CPT

## 2025-01-06 PROCEDURE — 84153 ASSAY OF PSA TOTAL: CPT

## 2025-01-06 PROCEDURE — 3077F SYST BP >= 140 MM HG: CPT | Performed by: INTERNAL MEDICINE

## 2025-01-06 PROCEDURE — 1036F TOBACCO NON-USER: CPT | Performed by: INTERNAL MEDICINE

## 2025-01-06 PROCEDURE — 84460 ALANINE AMINO (ALT) (SGPT): CPT

## 2025-01-06 PROCEDURE — 1159F MED LIST DOCD IN RCRD: CPT | Performed by: INTERNAL MEDICINE

## 2025-01-06 PROCEDURE — 99214 OFFICE O/P EST MOD 30 MIN: CPT | Performed by: INTERNAL MEDICINE

## 2025-01-06 PROCEDURE — 3008F BODY MASS INDEX DOCD: CPT | Performed by: INTERNAL MEDICINE

## 2025-01-06 PROCEDURE — 3080F DIAST BP >= 90 MM HG: CPT | Performed by: INTERNAL MEDICINE

## 2025-01-06 PROCEDURE — 82306 VITAMIN D 25 HYDROXY: CPT

## 2025-01-06 PROCEDURE — 1123F ACP DISCUSS/DSCN MKR DOCD: CPT | Performed by: INTERNAL MEDICINE

## 2025-01-06 PROCEDURE — 1160F RVW MEDS BY RX/DR IN RCRD: CPT | Performed by: INTERNAL MEDICINE

## 2025-01-06 RX ORDER — ATENOLOL 100 MG/1
100 TABLET ORAL DAILY
Qty: 30 TABLET | Refills: 5 | Status: SHIPPED | OUTPATIENT
Start: 2025-01-06 | End: 2025-07-05

## 2025-01-06 NOTE — PROGRESS NOTES
"Subjective   Patient ID: Taco Alvarez is a 69 y.o. male who presents for Follow-up.    Here for follow-up  He has not yet done labs nor did he see the cardiologist  No exertional chest pain, palpitations, dizziness, orthopnea or pedal edema.  He notes he gets dizziness with his carvedilol.  He is concerned about the side effects including drowsiness and also that it states that he should not operate machinery.  He drives gasoline tanker trucks  He has given up alcohol for the most part though he did have some over the holidays with his relatives.         Review of Systems    Objective   BP (!) 144/94   Pulse 97   Ht 1.778 m (5' 10\")   Wt 72.5 kg (159 lb 12.8 oz)   SpO2 99%   BMI 22.93 kg/m²     Physical Exam  Vitals reviewed.   Constitutional:       Appearance: Normal appearance.   HENT:      Head: Normocephalic and atraumatic.   Eyes:      General: No scleral icterus.        Right eye: No discharge.         Left eye: No discharge.      Extraocular Movements: Extraocular movements intact.      Conjunctiva/sclera: Conjunctivae normal.      Pupils: Pupils are equal, round, and reactive to light.   Cardiovascular:      Rate and Rhythm: Normal rate and regular rhythm.      Pulses: Normal pulses.      Heart sounds: Normal heart sounds. No murmur heard.  Pulmonary:      Effort: Pulmonary effort is normal.      Breath sounds: Normal breath sounds. No wheezing or rhonchi.   Musculoskeletal:         General: No deformity or signs of injury. Normal range of motion.      Cervical back: Normal range of motion and neck supple. No rigidity or tenderness.   Lymphadenopathy:      Cervical: No cervical adenopathy.   Skin:     General: Skin is warm and dry.      Findings: No rash.   Neurological:      General: No focal deficit present.      Mental Status: He is alert and oriented to person, place, and time. Mental status is at baseline.      Cranial Nerves: No cranial nerve deficit.      Sensory: No sensory deficit.      Gait: " Gait normal.   Psychiatric:         Mood and Affect: Mood normal.         Behavior: Behavior normal.         Thought Content: Thought content normal.         Judgment: Judgment normal.         Assessment/Plan   Problem List Items Addressed This Visit    None  Visit Diagnoses         Codes    Labile hypertension    -  Primary R09.89    Relevant Medications    atenolol (Tenormin) 100 mg tablet    Other Relevant Orders    Referral to Cardiology                Portions of this encounter note have been copied from my previous note dated  7/3/24 , which have been updated where appropriate and all reflect my current medical decision making from today.     Noncompliance-he has not followed up here semiannually. He has refused additional blood pressure medication.              1/24-remarkably he has not been here in nearly 2 years-strongly encouraged visits at least semiannually to optimize his care plan             2/24-though he followed up accordingly-he is not taking his carvedilol twice daily as prescribed.  He read the package insert and is concerned about drowsiness.  I told him at low doses this is not normally an issue,             7/24; hasn't set up pulCovacsis appt. he is not taking his medication accordingly             1/25-he has not done fasting labs as previously requested.  He has not seen the cardiologist.  He is rather unclear whether he is taking the carvedilol twice daily    Health care coordination-his wife Peggy was with him at his January 2024 visit     Labile hypertension / white coat effect - intolerant to losartan / hctz - shortness of breath. Presently doing OK w/ atenolol. Blood pressure borderline on recheck. Discussed systolic goal blood pressure under 130. He declined additional medication in the past.   Blood pressure not at goal. He refuses additional medication at this time. Calcium score ordered last time         1/24-blood pressure is very high-I told him this could lead to a stroke or heart  attack.  I recommended he discontinue daily atenolol and instead use twice daily carvedilol.  I asked him to return in 6 weeks to review his blood pressure.  CT his calcium score ordered              2/24-blood pressure quite high on carvedilol once a day.  Strongly urged him to use this twice daily as prescribed.  He will bring his blood pressure machine in next time to check this.              7/24-his blood pressure is remarkably high in the office.  Though it improves it still high with a diastolic at 96.  Strongly encouraged more consistent medication use accordingly.  I asked him to meet with cardiology to review all of this in further detail             1/25-blood pressure remains elevated-he states the carvedilol causes dizziness and he is reluctant to use it because of the warning that he should be caution with operating machinery.  He drives gasoline tanker trucks.  However I told him that all blood pressure medicines have the potential to cause dizziness and they all would carry the warning about caution with driving machinery.  Once again I strongly advised him to take blood pressure medicine consistently and set up a cardiology consultation to review his labile blood pressure in further detail.  As requested-will return to a atenolol but at a higher dose-100 mg daily.  Advised him to remain off alcohol.  He is cut back a great deal but was using some alcohol over the holidays.    Elevated calcium score at 100 Jan '24 7/24 10 yr cardiac risk 16.4%.  I asked him to meet with cardiology to review all of this in further detail            1/25-once again encouraged cardiology consultation     Walking goal-encouraged 30 minutes 5 days weekly suggested last time. He is not doing that presently     Dyslipidemia / elevated 10 year cardiac risk assessment around 12% February 2019-statin and baby aspirin recommended.                     Awaiting follow-up lipids. He has discontinued aspirin with recent  press reports.  CT calcium score ordered again 1/24 2/24-calcium score scheduled soon.  LDL 1/24-81    Prediabetes-1/24-A1c stable at 5.8%.  BMI 23.6.  Will follow                    1/25-awaiting labs    COVID illness-September 2024.  Improved.  He has opted not to do any boosters    Pulmonary fibrosis concern-noted on his CT-pulmonary consultation ordered-not yet done             7/24-pulmonary consultation ordered again.  Encouraged him to do this.    Recurrent cholecystitis-January and March 2023.  Remarkably he canceled his planned surgery.  Remarkably his sister has had gallstone pancreatitis.  I recommended an ultrasound and general surgical consultation            2/24-with diet changes no recurrent symptoms    Intermittent arm and leg tingling-history and exam nondiagnostic-he will check labs and set up an EMG study    Excessive fatigue/sleep deprivation-his wife says he has snoring and stops breathing at times at night.  In light of his witnessed apnea-sleep evaluation           2/24-regarding his excessive fatigue-he states that he is quite tired all the time and that is why he will not take his blood pressure medicine more.  I told him sleep deprivation is the main issue at this point and possibly sleep apnea.  In fact, 5 days a week he goes to work at 2:30 AM getting out of bed.  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  When he gets home he takes a nap he states as they are typically 2 grandchildren there.  He goes to bed around 8 or 9 PM.  He will be 69 years old soon and clearly sleep deprivation is an issue.  He will follow-up with the sleep doctor as scheduled next month to consider a sleep study             7/24-he opted not to see the sleep provider         s/p right hernia repair Jan '19 per Dr. Smith / history of left inguinal hernia repair 1980s-he is doing well presently He will f/u with Dr. Smith with concerns.     acid reflux - occas. OTC  pepcid used     winter hand exzema - cream used in winter. saw Dr. Quintana in early 2017. Presently topical creams used as needed. Encourage this especially in the wintertime.     BPH-annual PSA continues             PSA ok Jan '24 1/25-awaiting labs    Diverticulosis/internal hemorrhoids/colonoscopy care-colonoscopy updated 7/23-internal hemorrhoids and pancolonic diverticulosis.  Encouraged ample fluid.              Next colonoscopy recommended 10 years-7/33     knee arthritis-caution with overactivity. Right knee remains problematic occasionally     History of partial right rotator cuff tear-he is tolerating occasional shoulder pain.    Doing well for the most part though - rarely sore; limited strength tho.     Hearing concerns-his wife, Peggy notes that his hearing is a bit diminished. At this point he doesn't want to consider audiology consultation     History Bilateral cerumen - noted on recent DOT exam. Last time suggested Debrox drops twice a day for 7-10 days and then follow-up for ear lavage. Asymptomatic. Once again explained home routine he would rather do this then see ENT last time.   ENT evaluation to remove cerumen recommended     dental care - he follows w/ regular dentist twice yearly, as well as a gum specialist twice yearly     Vision concerns-reading glasses /history of early cataract- last visit w/ Dr. Reid 2015. Encouraged annual eye exam-ordered            [place] cataracts bilateral noted at his last appointment with Dr. Yepez in optometry in Feb '20.  Encouraged him to set up an appointment to see her.  Remarkably initially he said he did not have cataracts but I showed him her office note documenting this-strongly encouraged him to get in to get seen             He notes his DOT exams only test for distance vision.  Encouraged him to follow-up with his eye provider nevertheless.        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 For the most part  doing well presently. Maybe going to 4 days / week in 2022, but still works 50 hours most weeks            2/24-more job stress after the  was killed last month driving his truck.  He worked for their firm.            1/25-with his wife's health concerns and finances he states he will not be retiring anytime soon.     Caregiving concerns-his wife has been diagnosed with a sinus fungal mass which has been removed in ENT. Support provided she has a lot of health concerns with her rheumatology medications and immunosuppressed state. Lots of medical bills so he continues to work overtime to help.             1/25-she has a number of concerns-and appointments-support provided.  She is 6 years younger     DOT exam - annually w/ HTN. He goes to [place]. updated Feb 2022.  This is every 2 years.                Next in Mar '25     Flu shot encouraged each fall-- he declines     Discussed Shingrix 7/24 - declined    RSV vacc - discussed / declined 7/24     pneumovax updated 2/24     Covid series-completed; booster encouraged / declined     Follow-up semianually, sooner with concerns     Charting was completed using voice recognition technology and may include unintended errors.

## 2025-01-08 DIAGNOSIS — E78.5 DYSLIPIDEMIA, GOAL LDL BELOW 70: ICD-10-CM

## 2025-01-08 DIAGNOSIS — R73.03 PREDIABETES: Primary | ICD-10-CM

## 2025-01-08 RX ORDER — ATORVASTATIN CALCIUM 40 MG/1
40 TABLET, FILM COATED ORAL DAILY
Qty: 90 TABLET | Refills: 3 | Status: SHIPPED | OUTPATIENT
Start: 2025-01-08

## 2025-04-01 ENCOUNTER — APPOINTMENT (OUTPATIENT)
Dept: OPHTHALMOLOGY | Facility: CLINIC | Age: 70
End: 2025-04-01
Payer: COMMERCIAL

## 2025-07-07 ENCOUNTER — APPOINTMENT (OUTPATIENT)
Dept: PRIMARY CARE | Facility: CLINIC | Age: 70
End: 2025-07-07
Payer: COMMERCIAL

## 2025-08-22 DIAGNOSIS — I10 ESSENTIAL HYPERTENSION WITH GOAL BLOOD PRESSURE LESS THAN 130/85: Chronic | ICD-10-CM

## 2025-08-22 RX ORDER — ASPIRIN 81 MG/1
TABLET ORAL
Qty: 90 TABLET | Refills: 3 | Status: SHIPPED | OUTPATIENT
Start: 2025-08-22

## 2025-08-28 ENCOUNTER — APPOINTMENT (OUTPATIENT)
Dept: OPHTHALMOLOGY | Facility: CLINIC | Age: 70
End: 2025-08-28
Payer: COMMERCIAL

## 2025-08-28 DIAGNOSIS — H52.223 REGULAR ASTIGMATISM OF BOTH EYES: ICD-10-CM

## 2025-08-28 DIAGNOSIS — H52.03 HYPERMETROPIA OF BOTH EYES: Primary | ICD-10-CM

## 2025-08-28 DIAGNOSIS — H52.4 PRESBYOPIA: ICD-10-CM

## 2025-08-28 DIAGNOSIS — H25.813 COMBINED FORMS OF AGE-RELATED CATARACT OF BOTH EYES: ICD-10-CM

## 2025-08-28 ASSESSMENT — REFRACTION
OD_ADD: +2.25
OD_CYLINDER: +1.25
OS_ADD: +2.25
OD_SPHERE: -0.25
OS_SPHERE: -0.25
OS_CYLINDER: +1.00
OS_AXIS: 002
OD_AXIS: 003

## 2025-08-28 ASSESSMENT — CONF VISUAL FIELD
OD_NORMAL: 1
OD_SUPERIOR_TEMPORAL_RESTRICTION: 0
OS_INFERIOR_NASAL_RESTRICTION: 0
METHOD: COUNTING FINGERS
OS_INFERIOR_TEMPORAL_RESTRICTION: 0
OS_SUPERIOR_TEMPORAL_RESTRICTION: 0
OD_INFERIOR_TEMPORAL_RESTRICTION: 0
OD_INFERIOR_NASAL_RESTRICTION: 0
OD_SUPERIOR_NASAL_RESTRICTION: 0
OS_NORMAL: 1
OS_SUPERIOR_NASAL_RESTRICTION: 0

## 2025-08-28 ASSESSMENT — ENCOUNTER SYMPTOMS
CARDIOVASCULAR NEGATIVE: 1
PSYCHIATRIC NEGATIVE: 0
MUSCULOSKELETAL NEGATIVE: 0
ENDOCRINE NEGATIVE: 0
EYES NEGATIVE: 0
CONSTITUTIONAL NEGATIVE: 0
NEUROLOGICAL NEGATIVE: 0
ALLERGIC/IMMUNOLOGIC NEGATIVE: 0
HEMATOLOGIC/LYMPHATIC NEGATIVE: 0
GASTROINTESTINAL NEGATIVE: 0
RESPIRATORY NEGATIVE: 0

## 2025-08-28 ASSESSMENT — CUP TO DISC RATIO
OD_RATIO: .3
OS_RATIO: .35

## 2025-08-28 ASSESSMENT — REFRACTION_WEARINGRX
OS_ADD: +2.25
OS_CYLINDER: -0.75
OD_SPHERE: +0.75
OD_CYLINDER: -0.75
OS_SPHERE: +0.25
OD_AXIS: 093
OS_AXIS: 092
OD_ADD: +2.25

## 2025-08-28 ASSESSMENT — VISUAL ACUITY
METHOD: SNELLEN - LINEAR
OS_SC: 20/30
OD_SC: 20/25
OS_SC+: -2

## 2025-08-28 ASSESSMENT — REFRACTION_MANIFEST
OD_AXIS: 003
OD_CYLINDER: +1.25
OS_CYLINDER: +0.75
OD_SPHERE: -0.25
OD_ADD: +2.25
OS_AXIS: 002
OS_SPHERE: -0.50
OS_ADD: +2.25

## 2025-08-28 ASSESSMENT — EXTERNAL EXAM - LEFT EYE: OS_EXAM: NORMAL

## 2025-08-28 ASSESSMENT — TONOMETRY
OD_IOP_MMHG: 21
OS_IOP_MMHG: 21
IOP_METHOD: GOLDMANN APPLANATION

## 2025-08-28 ASSESSMENT — EXTERNAL EXAM - RIGHT EYE: OD_EXAM: NORMAL

## 2026-09-02 ENCOUNTER — APPOINTMENT (OUTPATIENT)
Dept: OPHTHALMOLOGY | Facility: CLINIC | Age: 71
End: 2026-09-02
Payer: COMMERCIAL